# Patient Record
Sex: FEMALE | Race: WHITE | NOT HISPANIC OR LATINO | Employment: OTHER | URBAN - NONMETROPOLITAN AREA
[De-identification: names, ages, dates, MRNs, and addresses within clinical notes are randomized per-mention and may not be internally consistent; named-entity substitution may affect disease eponyms.]

---

## 2017-10-02 ENCOUNTER — HISTORY (OUTPATIENT)
Dept: RADIOLOGY | Facility: OTHER | Age: 67
End: 2017-10-02

## 2017-10-02 ENCOUNTER — OFFICE VISIT - GICH (OUTPATIENT)
Dept: FAMILY MEDICINE | Facility: OTHER | Age: 67
End: 2017-10-02

## 2017-10-02 ENCOUNTER — HOSPITAL ENCOUNTER (OUTPATIENT)
Dept: RADIOLOGY | Facility: OTHER | Age: 67
End: 2017-10-02
Attending: NURSE PRACTITIONER

## 2017-10-02 DIAGNOSIS — I10 ESSENTIAL (PRIMARY) HYPERTENSION: ICD-10-CM

## 2017-10-02 DIAGNOSIS — Z12.31 ENCOUNTER FOR SCREENING MAMMOGRAM FOR MALIGNANT NEOPLASM OF BREAST: ICD-10-CM

## 2017-10-02 DIAGNOSIS — Z87.891 PERSONAL HISTORY OF NICOTINE DEPENDENCE: ICD-10-CM

## 2017-10-02 DIAGNOSIS — Z91.89 OTHER SPECIFIED PERSONAL RISK FACTORS, NOT ELSEWHERE CLASSIFIED: ICD-10-CM

## 2017-10-02 DIAGNOSIS — Z00.00 ENCOUNTER FOR GENERAL ADULT MEDICAL EXAMINATION WITHOUT ABNORMAL FINDINGS: ICD-10-CM

## 2017-10-02 DIAGNOSIS — L82.0 INFLAMED SEBORRHEIC KERATOSIS: ICD-10-CM

## 2017-10-02 DIAGNOSIS — Z66 DO NOT RESUSCITATE: ICD-10-CM

## 2017-10-02 DIAGNOSIS — Z23 ENCOUNTER FOR IMMUNIZATION: ICD-10-CM

## 2017-10-02 LAB
ANION GAP - HISTORICAL: 9 (ref 5–18)
BUN SERPL-MCNC: 8 MG/DL (ref 7–25)
BUN/CREAT RATIO - HISTORICAL: 12
CALCIUM SERPL-MCNC: 9.9 MG/DL (ref 8.6–10.3)
CHLORIDE SERPLBLD-SCNC: 101 MMOL/L (ref 98–107)
CHOL/HDL RATIO - HISTORICAL: 3.42
CHOLESTEROL TOTAL: 195 MG/DL
CO2 SERPL-SCNC: 27 MMOL/L (ref 21–31)
CREAT SERPL-MCNC: 0.69 MG/DL (ref 0.7–1.3)
GFR IF NOT AFRICAN AMERICAN - HISTORICAL: >60 ML/MIN/1.73M2
GLUCOSE SERPL-MCNC: 109 MG/DL (ref 70–105)
HDLC SERPL-MCNC: 57 MG/DL (ref 23–92)
LDLC SERPL CALC-MCNC: 121 MG/DL
NON-HDL CHOLESTEROL - HISTORICAL: 138 MG/DL
POTASSIUM SERPL-SCNC: 4.6 MMOL/L (ref 3.5–5.1)
PROVIDER ORDERDED STATUS - HISTORICAL: ABNORMAL
SODIUM SERPL-SCNC: 137 MMOL/L (ref 133–143)
TRIGL SERPL-MCNC: 87 MG/DL

## 2017-10-02 ASSESSMENT — PATIENT HEALTH QUESTIONNAIRE - PHQ9: SUM OF ALL RESPONSES TO PHQ QUESTIONS 1-9: 0

## 2017-10-04 ENCOUNTER — COMMUNICATION - GICH (OUTPATIENT)
Dept: FAMILY MEDICINE | Facility: OTHER | Age: 67
End: 2017-10-04

## 2017-10-10 ENCOUNTER — HOSPITAL ENCOUNTER (OUTPATIENT)
Dept: RADIOLOGY | Facility: OTHER | Age: 67
End: 2017-10-10
Attending: NURSE PRACTITIONER

## 2017-10-10 DIAGNOSIS — Z87.891 PERSONAL HISTORY OF NICOTINE DEPENDENCE: ICD-10-CM

## 2017-10-18 ENCOUNTER — COMMUNICATION - GICH (OUTPATIENT)
Dept: FAMILY MEDICINE | Facility: OTHER | Age: 67
End: 2017-10-18

## 2017-12-27 NOTE — PROGRESS NOTES
Patient Information     Patient Name MRN Sex Irene Padilla [id number] Female 1950      Progress Notes by Hallie Vargas NP at 10/2/2017  3:00 PM     Author:  Hallie Vargas NP Service:  (none) Author Type:  PHYS- Nurse Practitioner     Filed:  10/2/2017  5:02 PM Encounter Date:  10/2/2017 Status:  Signed     :  Hallie Vargas NP (PHYS- Nurse Practitioner)            SUBJECTIVE:    Irene Saravia is a 67 y.o. female who presents for welcome to Medicare preventive visit and screenings, medication review and other issues.  Continues to follow-up with Dr. Lucero gynecology oncology for her vulvar cancer about every 6 months. Dr. Lucero did a Pap last fall during her office exam which was negative with no endocervical's present.  She reports has never had a abnormal Pap history. Had her mammogram before her visit today and has no concerns.  Has been checking her blood pressure at home running 120-130's/60-80 and has been tolerating her lisinopril.  Smoking tobacco 3 years ago and has stated tobacco free.  Reports a rough patch of skin between her shoulder blades that his been itchy would like checked out.    Patient is  and lives alone independently in the community. Has a good family and friend support systems.    HPI    Allergies      Allergen   Reactions     Ascorbic Acid (Vitamin C)  Herpetiformis Dermatitis     Cold sores    ,   Family History       Problem   Relation Age of Onset     Diabetes  Father      Psychiatric illness  Father      Alzheimer's       Cancer-breast  Sister 40     Good Health  Mother      Hypertension  Mother      Good Health  Sister      Diabetes  Sister      Good Health  Brother      x3       Diabetes  Brother      Diabetes  Other      Positive for diabetes mellitus type 2       Cancer-colon  Paternal Uncle 80     Cancer-ovarian  No Family History      Cancer-prostate  No Family History      Heart Disease  No Family History      Stroke  No Family History       Thyroid Disease  No Family History      Blood Disease  No Family History    ,   Current Outpatient Prescriptions on File Prior to Visit       Medication  Sig Dispense Refill     ranitidine (ZANTAC) 150 mg tablet Take 150 mg by mouth once daily if needed.       No current facility-administered medications on file prior to visit.    ,   Current Outpatient Prescriptions:      lisinopril (PRINIVIL; ZESTRIL) 10 mg tablet, Take 1 tablet by mouth once daily., Disp: 90 tablet, Rfl: 3     ranitidine (ZANTAC) 150 mg tablet, Take 150 mg by mouth once daily if needed., Disp: , Rfl:   Medications have been reviewed by me and are current to the best of my knowledge and ability.,   Past Medical History:     Diagnosis  Date     Hx of one miscarriage      LSIL (low grade squamous intraepithelial lesion) on Pap smear      Smoker    ,   Patient Active Problem List       Diagnosis  Date Noted     DNR (do not resuscitate)  10/02/2017     Seborrheic keratoses, inflamed  10/02/2017     Health care maintenance  10/19/2016     History of loop electrical excision procedure (LEEP)  09/28/2016     Breast cancer screening  09/28/2016     History of tobacco use  09/28/2016     Family history of hypertension  09/28/2016     Primary vulvar squamous cell carcinoma (HC)  07/31/2014     PAP SMEAR, ABNORMAL, ASCUS  03/23/2011     TOBACCO ABUSE       HERNIATED DISC       neck        ,   Past Surgical History:      Procedure  Laterality Date     COLONOSCOPY DIAGNOSTIC  10/31/2016    F/U 2026       COLPOSCOPY  2011    LEEP, alex       NECK/THORAX PROCEDURE      Reduction of herniated disc       RADIATION TREATMENT  8/2014    vulvar cancer, chemotherapy      and   Social History       Substance Use Topics         Smoking status:   Former Smoker     Packs/day:  0.25     Years:  40.00     Types:  Cigarettes     Quit date:  8/28/2015     Smokeless tobacco:   Never Used      Comment: It's been over one year since quitting      Alcohol use   No  "      REVIEW OF SYSTEMS:  Review of Systems   Constitutional: Negative.    HENT: Negative.    Eyes: Negative.    Respiratory: Negative.    Cardiovascular: Negative.    Gastrointestinal: Negative.    Genitourinary: Negative.    Musculoskeletal: Negative.    Skin: Positive for itching and rash.   Neurological: Negative.    Endo/Heme/Allergies: Negative.    Psychiatric/Behavioral: Negative.        OBJECTIVE:  /78  Pulse 100  Ht 1.575 m (5' 2\")  Wt 73.7 kg (162 lb 8 oz)  Breastfeeding? No  BMI 29.72 kg/m2    EXAM:   Physical Exam   Constitutional: She is oriented to person, place, and time and well-developed, well-nourished, and in no distress.   HENT:   Head: Normocephalic and atraumatic.   Mouth/Throat: Oropharynx is clear and moist.   Eyes: Conjunctivae and EOM are normal. Pupils are equal, round, and reactive to light. Right eye exhibits no discharge. Left eye exhibits no discharge. No scleral icterus.   Neck: Normal range of motion. Neck supple. No JVD present.   Cardiovascular: Normal rate, regular rhythm, normal heart sounds and intact distal pulses.  Exam reveals no gallop and no friction rub.    No murmur heard.  Pulmonary/Chest: Effort normal and breath sounds normal.   Musculoskeletal: Normal range of motion.   Lymphadenopathy:     She has no cervical adenopathy.   Neurological: She is alert and oriented to person, place, and time. Gait normal.   Skin: Skin is warm and dry.   Dome-shaped 2cm seborrheic keratosis rough surface above bra line posterior back--- treated with cryo-until white ×3--tolerated well   Psychiatric: Mood, memory, affect and judgment normal.   Nursing note and vitals reviewed.      ASSESSMENT/PLAN:    ICD-10-CM    1. Welcome to Medicare preventive visit Z00.00 BASIC METABOLIC PANEL      LIPID PANEL      BASIC METABOLIC PANEL      LIPID PANEL   2. Hypertension I10 lisinopril (PRINIVIL; ZESTRIL) 10 mg tablet      BASIC METABOLIC PANEL      LIPID PANEL      BASIC METABOLIC PANEL "      LIPID PANEL   3. Flu vaccine need Z23 FLU VACCINE => 3 YRS PF QUADRIVALENT IIV4 IM      [place] ADMIN FLU VACC SEASONAL (MEDICARE)   4. Streptococcus pneumoniae vaccination indicated Z91.89    5. History of tobacco use Z87.891 OMNI PREVNAR 13 (AKA PNEUMOCOCCAL VACCINE 13-VALENT IM)      US ABD AORTA SCREENING   6. Encounter for immunization  Z23 OMNI PREVNAR 13 (AKA PNEUMOCOCCAL VACCINE 13-VALENT IM)      [place] ADMIN EA ADDL VACC   7. DNR (do not resuscitate) Z66    8. Seborrheic keratoses, inflamed L82.0     reviewed negative mammography findings during office visit    labs pending    abdominal aorta ultrasound screening pending    Appears irritated seborrheic keratosis on back from clothing changes--- we'll treat with cryo-as patient wishes to proceed    Plan:  Prevnar 13 and influenza vaccine updated    Medication reviewed and refilled    Reviewed advanced healthcare directive during office visit--will complete 5 wishes and return to scan into medical records later  patient is adamant she wants to be a DNR status    Patient will have Pap done at GYN oncology with follow-ups    discussed cryo-aftercare--- try to avoid direct contact or irritation until healed--- monitor for any redness or signs of infection    Discussed shingles vaccine--could obtain a retail pharmacy  colonoscopy up-to-date done in 2016

## 2017-12-27 NOTE — PROGRESS NOTES
Patient Information     Patient Name MRN Sex Irene Padilla [id number] Female 1950      Progress Notes by Keely Gray R.T. (ARRT) at 10/2/2017  2:30 PM     Author:  Keely [facility] R.T. [facility] Service:  (none) Author Type:  (none)     Filed:  10/2/2017  2:30 PM Date of Service:  10/2/2017  2:30 PM Status:  Signed     :  Keely Gray R.T. (IZAT) ([facility] - Registered Radiologic Technologist)            Falls Risk Criteria:    Age 65 and older or under age 4        Sensory deficits    Poor vision    Use of ambulatory aides    Impaired judgment    Unable to walk independently    Meets High Risk criteria for falls:  Yes               1.  Do you have dizziness or vertigo?    no                    2.  Do you need help standing or walking?   no                 3.  Have you fallen within the last 6 months?    no           4.  Has the patient been fasting?      no       If any risks are marked Yes, the following interventions are utilized:    Do not leave patient unattended     Assist patient in the dressing room and bathroom    Have ambulatory aides available throughout procedure    Involve patient s family if available

## 2017-12-28 NOTE — PROGRESS NOTES
Patient Information     Patient Name MRN Sex Irene Padilla [id number] Female 1950      Progress Notes by Dede Heard R.T. ([facility]) at 10/10/2017 12:01 PM     Author:  [facility] Service:  (none) Author Type:  [employer] - Registered Radiologic Technologist     Filed:  10/10/2017 12:02 PM Date of Service:  10/10/2017 12:01 PM Status:  Signed     :  Dede Heard R.T. (ARRT) ([facility] - Registered Radiologic Technologist)            Falls Risk Criteria:    Age 65 and older or under age 4        Sensory deficits    Poor vision    Use of ambulatory aides    Impaired judgment    Unable to walk independently    Meets High Risk criteria for falls:  Yes               1.  Do you have dizziness or vertigo?    no                    2.  Do you need help standing or walking?   no                 3.  Have you fallen within the last 6 months?    no           4.  Has the patient been fasting?      yes       If any risks are marked Yes, the following interventions are utilized:    Do not leave patient unattended     Assist patient in the dressing room and bathroom    Have ambulatory aides available throughout procedure    Involve patient s family if available

## 2017-12-29 NOTE — PATIENT INSTRUCTIONS
Patient Information     Patient Name MRN Sex Irene Padilla [id number] Female 1950      Patient Instructions by Hallie Vargas NP at 10/2/2017  3:00 PM     Author:  Hallie Vargas NP Service:  (none) Author Type:  PHYS- Nurse Practitioner     Filed:  10/2/2017  5:01 PM Encounter Date:  10/2/2017 Status:  Signed     :  Hallie Vargas NP (PHYS- Nurse Practitioner)            Monitor skin site for any signs of infection    Keep clean and prevent irritation should heal in the next 2-3 weeks and dry/crumble off

## 2017-12-30 NOTE — NURSING NOTE
Patient Information     Patient Name MRN Irene Bundy [id number] Female 1950      Nursing Note by Latoya Mcdowell at 10/2/2017  3:00 PM     Author:  Latoya Mcdowell Service:  (none) Author Type:  (none)     Filed:  10/2/2017  3:17 PM Encounter Date:  10/2/2017 Status:  Signed     :  Latoya Mcdowell            Patient presents to clinic today for a Welcome to Medicare Physical. She states she is feeling well and doesn't have any concerns. She states she would like a flu shot.    Latoya Mcdowell LPN...................10/2/2017  2:54 PM

## 2018-01-26 VITALS
DIASTOLIC BLOOD PRESSURE: 78 MMHG | WEIGHT: 162.5 LBS | BODY MASS INDEX: 29.9 KG/M2 | SYSTOLIC BLOOD PRESSURE: 138 MMHG | HEART RATE: 100 BPM | HEIGHT: 62 IN

## 2018-01-29 ASSESSMENT — PATIENT HEALTH QUESTIONNAIRE - PHQ9: SUM OF ALL RESPONSES TO PHQ QUESTIONS 1-9: 0

## 2018-02-14 ENCOUNTER — DOCUMENTATION ONLY (OUTPATIENT)
Dept: FAMILY MEDICINE | Facility: OTHER | Age: 68
End: 2018-02-14

## 2018-02-14 PROBLEM — L82.0 SEBORRHEIC KERATOSES, INFLAMED: Status: ACTIVE | Noted: 2017-10-02

## 2018-02-14 PROBLEM — Z66 DNR (DO NOT RESUSCITATE): Status: ACTIVE | Noted: 2017-10-02

## 2018-02-14 PROBLEM — Z72.0 TOBACCO ABUSE: Status: ACTIVE | Noted: 2018-02-14

## 2018-02-14 RX ORDER — LISINOPRIL 10 MG/1
10 TABLET ORAL DAILY
COMMUNITY
Start: 2017-10-02 | End: 2018-10-02

## 2018-07-23 NOTE — PROGRESS NOTES
Patient Information     Patient Name  Irene Saravia MRN  [id number] Sex  Female   1950      Letter by Gwen Alonso MD at      Author:  Gwen Alonso MD Service:  (none) Author Type:  (none)    Filed:   Date of Service:   Status:  (Other)       [facility]  [address]  [facility] [address]  [phone]         Irene Saravia   [address]  [facility] [address]      October 3, 2017  Date of Breast Imaging: 10/02/2017  2:41 PM    Dear Ms. Saravia:  We are pleased to inform you that the result of your recent breast imaging examination is normal/benign (not cancer). A report of your results was sent to your health care provider(s).    Your mammogram shows that your breast tissue is dense.  Dense breast tissue is relatively common and is found in more than 50 percent of women. Dense breast tissue may be associated with a slight increased risk of breast cancer and may make cancer more difficult to detect by mammogram. However, the actual risk of breast cancer for women with dense breast tissue is still low. This information is given to you to raise your own awareness and help you talk with your primary care provider. Together, you can decide which screening options are right for you.     Your images will become part of your medical file here at [facility] and will be available for your continuing care. You are responsible for informing any new health care provider or breast imaging facility of the date and location of this examination.    Mammography remains the gold standard and is the most accurate method for early detection. Mammograms are the only medical imaging test shown to reduce breast cancer deaths. Not all cancers are found through mammography. If you notice any new changes in your breast(s) please inform your healthcare provider.     Thank you for choosing [facility] to participate in your healthcare needs.     [facility]  [facility] Recommendations for Early Breast Cancer Detection   in Women without Symptoms  When to start having mammograms to screen for breast cancer, and how often to have them, is a personal decision. It should be based on your preferences, your values and your risk for developing breast cancer. [facility] recommends that you and your health care provider together determine when mammograms are right for you.    [facility] recommends the following guidelines for women who have an average risk for breast cancer, based on American Cancer Society guidelines:    Age 40 to 44: Mammograms are optional.     Age 45 to 54: Have a mammogram every year.           Age 55 and older: Have a mammogram every year, or transition to having one every 2 years. Continue to have mammograms as long as your health is good.  If you have a higher than average risk for breast cancer, your health care provider may recommend a different schedule.

## 2018-07-24 NOTE — PROGRESS NOTES
Patient Information     Patient Name  Irene Sarvaia MRN  [id number] Sex  Female   1950      Letter by Hallie Vargas NP at      Author:  Hallie Vargas NP Service:  (none) Author Type:  (none)    Filed:   Encounter Date:  10/4/2017 Status:  (Other)           Irene Saravia  [address] [facility] [address]          2017    Dear Ms. Saravia:    You are receiving this letter because you recently took a test to measure your current fasting lipid levels.  Your current fasting lipid levels from 10/2/2017 are:    I am happy to report that your cholesterol and lipids are within acceptable and target range and your other labs show normal indicators of kidney function.  Please continue your daily dietary and exercise measures as they are working well for you at this time.    Cholesterol:  195 mg/dL   Triglycerides: 87 mg/dL   HDL Cholesterol: 57 mg/dL   LDL Cholesterol:  Direct LDL Cholesterol:   121 mg/dL  No results found in past 5 years mg/dL   Cholesterol/HDL ratio: 3.42      Results for orders placed or performed in visit on 10/02/17      BASIC METABOLIC PANEL      Result  Value Ref Range    SODIUM 137 133 - 143 mmol/L    POTASSIUM 4.6 3.5 - 5.1 mmol/L    CHLORIDE 101 98 - 107 mmol/L    CO2,TOTAL 27 21 - 31 mmol/L    ANION GAP 9 5 - 18                    GLUCOSE 109 (H) 70 - 105 mg/dL    CALCIUM 9.9 8.6 - 10.3 mg/dL    BUN 8 7 - 25 mg/dL    CREATININE 0.69 (L) 0.70 - 1.30 mg/dL    BUN/CREAT RATIO           12                    GFR if African American >60 >60 ml/min/1.73m2    GFR if not African American >60 >60 ml/min/1.73m2   LIPID PANEL      Result  Value Ref Range    CHOLESTEROL,TOTAL 195 <200 mg/dL    TRIGLYCERIDES 87 <150 mg/dL    HDL CHOLESTEROL 57 23 - 92 mg/dL    NON-HDL CHOLESTEROL 138 <145 mg/dl    CHOL/HDL RATIO            3.42 <4.50                    LDL CHOLESTEROL 121 (H) <100 mg/dL    PROVIDER ORDERED STATUS FASTING        If you are at normal or low risk, your goal should  be to have a total cholesterol level of less than 200, a LDL (bad cholesterol) level of less than 130, a HDL (good cholesterol) level of greater than 45 and a triglyceride level of less than 150.    If you have certain risk factors, such as diabetes or existing heart disease, your goal should be to lower your total cholesterol to a number below 180 and your LDL level to a number less than 100.    Please continue to monitor your diet for cholesterol and fats, especially saturated fats.  You should also continue to increase your intake of fiber and vegetables, and continue your exercise program.    Sincerely,    Hallie Vargas NP ....................  10/4/2017   7:50 AM    [facility]

## 2018-07-24 NOTE — PROGRESS NOTES
Patient Information     Patient Name  Irene Saravia MRN  [id number] Sex  Female   1950      Letter by Hallie Vargas NP at      Author:  Hallie Vargas NP Service:  (none) Author Type:  (none)    Filed:   Encounter Date:  10/18/2017 Status:  (Other)           Irene Saravia  [address] [facility] [address]          2017    Dear Ms. Saravia:    Your abdominal ultrasound screening test did not show any blood vessel abnormalities.      If you have any further questions or problems contact my office at  [phone]    Thank you,    Hallie Vargas NP ....................  10/18/2017   6:36 PM

## 2018-10-02 ENCOUNTER — OFFICE VISIT (OUTPATIENT)
Dept: FAMILY MEDICINE | Facility: OTHER | Age: 68
End: 2018-10-02
Attending: NURSE PRACTITIONER
Payer: MEDICARE

## 2018-10-02 ENCOUNTER — HOSPITAL ENCOUNTER (OUTPATIENT)
Dept: MAMMOGRAPHY | Facility: OTHER | Age: 68
Discharge: HOME | End: 2018-10-02
Attending: NURSE PRACTITIONER | Admitting: NURSE PRACTITIONER
Payer: MEDICARE

## 2018-10-02 VITALS
HEIGHT: 62 IN | WEIGHT: 166.6 LBS | DIASTOLIC BLOOD PRESSURE: 82 MMHG | HEART RATE: 96 BPM | TEMPERATURE: 98 F | BODY MASS INDEX: 30.66 KG/M2 | SYSTOLIC BLOOD PRESSURE: 140 MMHG

## 2018-10-02 DIAGNOSIS — C51.9 PRIMARY VULVAR SQUAMOUS CELL CARCINOMA (H): ICD-10-CM

## 2018-10-02 DIAGNOSIS — Z23 ENCOUNTER FOR IMMUNIZATION: ICD-10-CM

## 2018-10-02 DIAGNOSIS — I10 HYPERTENSION, UNSPECIFIED TYPE: Primary | ICD-10-CM

## 2018-10-02 DIAGNOSIS — Z79.899 ENCOUNTER FOR MEDICATION REVIEW: ICD-10-CM

## 2018-10-02 DIAGNOSIS — Z12.39 SCREENING BREAST EXAMINATION: ICD-10-CM

## 2018-10-02 LAB
ANION GAP SERPL CALCULATED.3IONS-SCNC: 13 MMOL/L (ref 3–14)
BUN SERPL-MCNC: 13 MG/DL (ref 7–25)
CALCIUM SERPL-MCNC: 9.9 MG/DL (ref 8.6–10.3)
CHLORIDE SERPL-SCNC: 98 MMOL/L (ref 98–107)
CHOLEST SERPL-MCNC: 232 MG/DL
CO2 SERPL-SCNC: 22 MMOL/L (ref 21–31)
CREAT SERPL-MCNC: 0.7 MG/DL (ref 0.6–1.2)
GFR SERPL CREATININE-BSD FRML MDRD: 84 ML/MIN/1.7M2
GLUCOSE SERPL-MCNC: 122 MG/DL (ref 70–105)
HDLC SERPL-MCNC: 57 MG/DL (ref 23–92)
LDLC SERPL CALC-MCNC: 153 MG/DL
NONHDLC SERPL-MCNC: 175 MG/DL
POTASSIUM SERPL-SCNC: 4.2 MMOL/L (ref 3.5–5.1)
SODIUM SERPL-SCNC: 133 MMOL/L (ref 134–144)
TRIGL SERPL-MCNC: 108 MG/DL

## 2018-10-02 PROCEDURE — 80061 LIPID PANEL: CPT | Performed by: NURSE PRACTITIONER

## 2018-10-02 PROCEDURE — G0008 ADMIN INFLUENZA VIRUS VAC: HCPCS

## 2018-10-02 PROCEDURE — 99214 OFFICE O/P EST MOD 30 MIN: CPT | Performed by: NURSE PRACTITIONER

## 2018-10-02 PROCEDURE — 80048 BASIC METABOLIC PNL TOTAL CA: CPT | Performed by: NURSE PRACTITIONER

## 2018-10-02 PROCEDURE — 36415 COLL VENOUS BLD VENIPUNCTURE: CPT | Performed by: NURSE PRACTITIONER

## 2018-10-02 PROCEDURE — 90686 IIV4 VACC NO PRSV 0.5 ML IM: CPT | Performed by: NURSE PRACTITIONER

## 2018-10-02 PROCEDURE — G0463 HOSPITAL OUTPT CLINIC VISIT: HCPCS

## 2018-10-02 PROCEDURE — 96372 THER/PROPH/DIAG INJ SC/IM: CPT

## 2018-10-02 PROCEDURE — G0463 HOSPITAL OUTPT CLINIC VISIT: HCPCS | Mod: 25

## 2018-10-02 PROCEDURE — 77063 BREAST TOMOSYNTHESIS BI: CPT

## 2018-10-02 RX ORDER — LISINOPRIL 10 MG/1
10 TABLET ORAL DAILY
Qty: 90 TABLET | Refills: 4 | Status: SHIPPED | OUTPATIENT
Start: 2018-10-02 | End: 2019-10-16

## 2018-10-02 ASSESSMENT — PAIN SCALES - GENERAL: PAINLEVEL: NO PAIN (0)

## 2018-10-02 NOTE — LETTER
October 2, 2018      Irene Saravia  [address] [facility] [address]        Dear ,    We are writing to inform you of your test results.  Your cholesterol and LDL cholesterol are little elevated from last year.  Your calculated cardiac risk is above 7%--occasions to treat over 7%  Based on history and risk factors  We could recheck your cholesterol and lipids fasting in 2 months and you could try lifestyle and dietary measures  Or we could start a medication to lower your cholesterol and lipids.  Please give me a call at your earliest convenience if agreeable.      The 10-year ASCVD risk score ([place] DIALLO Jr, et al., 2013) is: 10.4%    Values used to calculate the score:      Age: 68 years      Sex: Female      Is Non- : No      Diabetic: No      Tobacco smoker: No      Systolic Blood Pressure: 140 mmHg      Is BP treated: Yes      HDL Cholesterol: 57 mg/dL      Total Cholesterol: 232 mg/dL    Results for orders placed or performed in visit on 10/02/18   Lipid Panel   Result Value Ref Range    Cholesterol 232 (H) <200 mg/dL    Triglycerides 108 <150 mg/dL    HDL Cholesterol 57 23 - 92 mg/dL    LDL Cholesterol Calculated 153 (H) <100 mg/dL    Non HDL Cholesterol 175 (H) <130 mg/dL   Basic Metabolic Panel   Result Value Ref Range    Sodium 133 (L) 134 - 144 mmol/L    Potassium 4.2 3.5 - 5.1 mmol/L    Chloride 98 98 - 107 mmol/L    Carbon Dioxide 22 21 - 31 mmol/L    Anion Gap 13 3 - 14 mmol/L    Glucose 122 (H) 70 - 105 mg/dL    Urea Nitrogen 13 7 - 25 mg/dL    Creatinine 0.70 0.60 - 1.20 mg/dL    GFR Estimate 84 >60 mL/min/1.7m2    GFR Estimate If Black >90 >60 mL/min/1.7m2    Calcium 9.9 8.6 - 10.3 mg/dL             If you have any questions or concerns, please call the clinic at the number listed above.       Sincerely,        TERRIE Logan CNP

## 2018-10-02 NOTE — PROGRESS NOTES
SUBJECTIVE:   Irene Saravia is a 68 year old female who presents to clinic today for the following health issues:    Patient review and refill on lisinopril for hypertension--has been 120's systolically at home.  Following up with Dr. Lucero gynecology for her vulvar cancer history, negative follow-ups, last follow-up 5 years 2018--have Pap completed at that time, last Pap negative  Will have PAP at that time  Plans to come in before that last appointment for pelvic exam  Reports is not had any irritations or issues with some radiation the initial  Has mammography scheduled later today  Has no concerns  Monitoring blood pressure at home with a wrist cuff    HPI    Patient Active Problem List   Diagnosis     Breast cancer screening     DNR (do not resuscitate)     Family history of hypertension     Health care maintenance     Displacement of intervertebral disc     History of loop electrical excision procedure (LEEP)     History of tobacco use     Papanicolaou smear of cervix with atypical squamous cells of undetermined significance (ASC-US)     Primary vulvar squamous cell carcinoma (H)     Seborrheic keratoses, inflamed     Tobacco abuse     Past Surgical History:   Procedure Laterality Date     COLONOSCOPY      10/31/2016,F/U 2026     OTHER SURGICAL HISTORY      2011,ZXW642,COLPOSCOPY,LEEP, [place]     OTHER SURGICAL HISTORY      21899,NECK/THORAX PROCEDURE,Reduction of herniated disc     OTHER SURGICAL HISTORY      8/2014,208356,RADIATION TREATMENT,vulvar cancer, chemotherapy       Social History   Substance Use Topics     Smoking status: Former Smoker     Packs/day: 0.25     Years: 40.00     Types: Cigarettes     Quit date: 8/28/2015     Smokeless tobacco: Never Used      Comment: Quit smoking: It's been over one year since quitting     Alcohol use No     Family History   Problem Relation Age of Onset     Diabetes Father      Diabetes     Other - See Comments Father      Psychiatric illness,Alzheimer's  "    Breast Cancer Sister 40     Cancer-breast     Family History Negative Mother      Good Health     Hypertension Mother      Hypertension     Family History Negative Sister      Good Health     Diabetes Sister      Diabetes     Family History Negative Brother      Good Health,x3     Diabetes Brother      Diabetes     Diabetes Other      Diabetes,Positive for diabetes mellitus type 2     Colon Cancer Paternal Uncle 80     Cancer-colon     Ovarian Cancer No family hx of      Cancer-ovarian     Prostate Cancer No family hx of      Cancer-prostate     HEART DISEASE No family hx of      Heart Disease     Thyroid Disease No family hx of      Thyroid Disease     Blood Disease No family hx of      Blood Disease         Current Outpatient Prescriptions   Medication Sig Dispense Refill     lisinopril (PRINIVIL/ZESTRIL) 10 MG tablet Take 1 tablet (10 mg) by mouth daily 90 tablet 4     ranitidine (ZANTAC) 150 MG tablet Take 150 mg by mouth daily as needed       [DISCONTINUED] lisinopril (PRINIVIL/ZESTRIL) 10 MG tablet Take 10 mg by mouth daily       Allergies   Allergen Reactions     Ascorbate Other (See Comments)     Cold sores     Recent Labs   Lab Test  10/02/18   1254  10/02/17   1659  09/28/16   1243   LDL  153*  121*  137*   HDL  57  57  59   TRIG  108  87  89   CR  0.70  0.69*  0.83   GFRESTIMATED  84   --    --    GFRESTBLACK  >90  >60  >60   POTASSIUM  4.2  4.6  3.7      BP Readings from Last 3 Encounters:   10/02/18 140/82   10/02/17 138/78   10/26/16 140/82    Wt Readings from Last 3 Encounters:   10/02/18 166 lb 9.6 oz (75.6 kg)   10/02/17 162 lb 8 oz (73.7 kg)   10/26/16 157 lb 4 oz (71.3 kg)                  Labs reviewed in EPIC    Review of Systems   All other systems reviewed and are negative.       OBJECTIVE:     /82 (BP Location: Right arm, Patient Position: Right side, Cuff Size: Adult Regular)  Pulse 96  Temp 98  F (36.7  C) (Temporal)  Ht 5' 2\" (1.575 m)  Wt 166 lb 9.6 oz (75.6 kg)  " Breastfeeding? No  BMI 30.47 kg/m2  Body mass index is 30.47 kg/(m^2).  Physical Exam   Constitutional: She is oriented to person, place, and time. She appears well-developed and well-nourished.   Cardiovascular: Normal rate, regular rhythm and normal heart sounds.    Pulmonary/Chest: Effort normal and breath sounds normal.   Musculoskeletal: Normal range of motion.   Neurological: She is alert and oriented to person, place, and time.   Skin: Skin is warm and dry.   Psychiatric: She has a normal mood and affect. Her behavior is normal. Judgment and thought content normal.   Nursing note and vitals reviewed.      Results for orders placed or performed in visit on 10/02/18   Lipid Panel   Result Value Ref Range    Cholesterol 232 (H) <200 mg/dL    Triglycerides 108 <150 mg/dL    HDL Cholesterol 57 23 - 92 mg/dL    LDL Cholesterol Calculated 153 (H) <100 mg/dL    Non HDL Cholesterol 175 (H) <130 mg/dL   Basic Metabolic Panel   Result Value Ref Range    Sodium 133 (L) 134 - 144 mmol/L    Potassium 4.2 3.5 - 5.1 mmol/L    Chloride 98 98 - 107 mmol/L    Carbon Dioxide 22 21 - 31 mmol/L    Anion Gap 13 3 - 14 mmol/L    Glucose 122 (H) 70 - 105 mg/dL    Urea Nitrogen 13 7 - 25 mg/dL    Creatinine 0.70 0.60 - 1.20 mg/dL    GFR Estimate 84 >60 mL/min/1.7m2    GFR Estimate If Black >90 >60 mL/min/1.7m2    Calcium 9.9 8.6 - 10.3 mg/dL         ASSESSMENT/PLAN:     medication reviewed and refill x 1 year--will continue to monitor with home cuff    Mammography screening results pending    If she is agreeable would start statin    The 10-year ASCVD risk score (Emmy YOEL Jr, et al., 2013) is: 10.4%    Values used to calculate the score:      Age: 68 years      Sex: Female      Is Non- : No      Diabetic: No      Tobacco smoker: No      Systolic Blood Pressure: 140 mmHg      Is BP treated: Yes      HDL Cholesterol: 57 mg/dL      Total Cholesterol: 232 mg/dL     She will come in before final 5 year folowup with  Gyn/onc  Pelvic exam so that I can continue to monitor        1. Encounter for immunization    - GH IMM-  HC FLU VAC PRESRV FREE QUAD SPLIT VIR 3+YRS IM    2. HTN (hypertension)      3. Hypertension, unspecified type    - lisinopril (PRINIVIL/ZESTRIL) 10 MG tablet; Take 1 tablet (10 mg) by mouth daily  Dispense: 90 tablet; Refill: 4  - Lipid Panel; Future  - Basic Metabolic Panel; Future  - Lipid Panel  - Basic Metabolic Panel    4. Encounter for medication review      5. Primary vulvar squamous cell carcinoma (H)          TERRI[facility]

## 2018-10-02 NOTE — PATIENT INSTRUCTIONS
Mammography    Mammography is an X-ray exam of your breast tissue. The image it makes is called a mammogram. A mammogram can help find problems with your breasts, such as cysts or cancer. Mammography is the best breast cancer screening tool available.  Have screening mammograms and professional breast exams as often as your healthcare provider recommends. Also, be sure you know how your breasts normally look and feel. This makes it easier to notice any changes. Report changes to your healthcare provider as soon as possible.    How do I get ready for a mammogram?     Schedule the test for 1 week after your period. Your breasts are less sore then.    Make sure your clinic gets images of your last mammogram if it was done somewhere else. This lets the provider compare the 2 sets of images for any changes.    On the morning of your test, don t use deodorant, powder, or perfume.    Wear a top that you can take off easily.  What happens during a mammogram?     You will need to undress from the waist up.    The technologist will position your breast to get the best test results.    Each of your breasts will be compressed one at a time. This helps get the most complete X-ray image.    Your breasts will be repositioned to get at least 2 separate views of each breast.  What happens after a mammogram?    More X-rays are sometimes needed. If not done at the time of your initial mammogram, you ll be called to schedule them.    You should receive your test results in writing. Ask about this on the day of your appointment.    Have mammograms as often as your healthcare provider recommends.  Let the technologist know if:    You re pregnant or think you may be pregnant    You have breast implants    You have any scars or moles on or near your breasts    You ve had a breast biopsy or surgery    You re breastfeeding   Date Last Reviewed: 6/1/2017 2000-2017 [employer]. [address] [place] [address], [place] [address]. All  rights reserved. This information is not intended as a substitute for professional medical care. Always follow your healthcare professional's instructions.

## 2018-10-02 NOTE — MR AVS SNAPSHOT
After Visit Summary   10/2/2018    Irene Saravia    MRN: [id number]           Patient Information     Date Of Birth          1950        Visit Information        Provider Department      10/2/2018 11:15 AM Hallie Vargas APRN [facility]        Today's Diagnoses     Hypertension, unspecified type    -  1    Encounter for immunization        HTN (hypertension)          Care Instructions      Mammography    Mammography is an X-ray exam of your breast tissue. The image it makes is called a mammogram. A mammogram can help find problems with your breasts, such as cysts or cancer. Mammography is the best breast cancer screening tool available.  Have screening mammograms and professional breast exams as often as your healthcare provider recommends. Also, be sure you know how your breasts normally look and feel. This makes it easier to notice any changes. Report changes to your healthcare provider as soon as possible.    How do I get ready for a mammogram?     Schedule the test for 1 week after your period. Your breasts are less sore then.    Make sure your clinic gets images of your last mammogram if it was done somewhere else. This lets the provider compare the 2 sets of images for any changes.    On the morning of your test, don t use deodorant, powder, or perfume.    Wear a top that you can take off easily.  What happens during a mammogram?     You will need to undress from the waist up.    The technologist will position your breast to get the best test results.    Each of your breasts will be compressed one at a time. This helps get the most complete X-ray image.    Your breasts will be repositioned to get at least 2 separate views of each breast.  What happens after a mammogram?    More X-rays are sometimes needed. If not done at the time of your initial mammogram, you ll be called to schedule them.    You should receive your test results in writing. Ask about this on the  day of your appointment.    Have mammograms as often as your healthcare provider recommends.  Let the technologist know if:    You re pregnant or think you may be pregnant    You have breast implants    You have any scars or moles on or near your breasts    You ve had a breast biopsy or surgery    You re breastfeeding   Date Last Reviewed: 6/1/2017 2000-2017 [employer]. [address] [place] [address]. All rights reserved. This information is not intended as a substitute for professional medical care. Always follow your healthcare professional's instructions.                Follow-ups after your visit        Your next 10 appointments already scheduled     Oct 02, 2018  1:30 PM CDT   (Arrive by 1:15 PM)   MA SCREENING BILATERAL W/ PETRA with GHMA2   [facility] ([facility])    [address]  [place] [address]   [phone]           How do I prepare for my exam? (Food and drink instructions) No Food and Drink Restrictions.  How do I prepare for my exam? (Other instructions) Do not use any powder, lotion or deodorant under your arms or on your breast. If you do, we will ask you to remove it before your exam.  What should I wear: Wear comfortable, two-piece clothing.  How long does the exam take: Most scans will take 15 minutes.  What should I bring: Bring any previous mammograms from other facilities or have them mailed to the breast center.  Do I need a :  No  is needed.  What do I need to tell my doctor: If you have any allergies, tell your care team.  What should I do after the exam: No restrictions, You may resume normal activities.  What is this test: This test is an x-ray of the breast to look for breast disease. The breast is pressed between two plates to flatten and spread the tissue. An X-ray is taken of the breast from different angles.  Who should I call with questions: If you have any questions, please call  "the Imaging Department where you will have your exam. Directions, parking instructions, and other information is available on our website, [email or web address].  Other information about my exam Three-dimensional (3D) mammograms are available at [facility] locations in [facility], [facility], [facility], [place], and [place]. [facility] locations include [place] and the [facility] [place] and [facility] in [place].  Benefits of 3D mammograms include: * Improved rate of cancer detection * Decreases your chance of having to go back for more tests, which means fewer: * \"False-positive\" results (This means that there is an abnormal area but it isn't cancer.) * Invasive testing procedures, such as a biopsy or surgery * Can provide clearer images of the breast if you have dense breast tissue.  *3D mammography is an optional exam that anyone can have with a 2D mammogram. It doesn't replace or take the place of a 2D mammogram. 2D mammograms remain an effective screening test for all women.  Not all insurance companies cover the cost of a 3D mammogram. Check with your insurance.              Future tests that were ordered for you today     Open Future Orders        Priority Expected Expires Ordered    Lipid Panel Routine  10/2/2019 10/2/2018    Basic Metabolic Panel Routine  10/2/2019 10/2/2018            Who to contact     If you have questions or need follow up information about today's clinic visit or your schedule please contact [facility] directly at [phone].  Normal or non-critical lab and imaging results will be communicated to you by MyChart, letter or phone within 4 business days after the clinic has received the results. If you do not hear from us within 7 days, please contact the clinic through [employer] or phone. If you have a critical or abnormal lab result, we will notify you by phone as soon as possible.  Submit refill requests through [employer]imple or call your pharmacy and " "they will forward the refill request to us. Please allow 3 business days for your refill to be completed.          Additional Information About Your Visit        Care EveryWhere ID     This is your Care EveryWhere ID. This could be used by other organizations to access your [facility] medical records  [id number]        Your Vitals Were     Pulse Temperature Height Breastfeeding? BMI (Body Mass Index)       96 98  F (36.7  C) (Temporal) 5' 2\" (1.575 m) No 30.47 kg/m2        Blood Pressure from Last 3 Encounters:   10/02/18 140/82   10/02/17 138/78   10/26/16 140/82    Weight from Last 3 Encounters:   10/02/18 166 lb 9.6 oz (75.6 kg)   10/02/17 162 lb 8 oz (73.7 kg)   10/26/16 157 lb 4 oz (71.3 kg)              We Performed the Following     GH IMM-  HC FLU VAC PRESRV FREE QUAD SPLIT VIR 3+YRS IM          Where to get your medicines      These medications were sent to [facility] [address] [address]     Phone:  [phone]     lisinopril 10 MG tablet          Primary Care Provider Office Phone # Fax #    Hallie TERRIE Bee [phone]       [address] [place] [address]        Equal Access to Services     NORMA ZAVALA : Hadii aad ku hadasho Soadelineali, waaxda luqadaha, qaybta kaalmada alexus, jordy barakat. So [facility] [phone].    ATENCIÓN: Si habla español, tiene a miramontes disposición servicios gratuitos de asistencia lingüística. Guerrero al [phone].    We comply with applicable federal civil rights laws and [place] laws. We do not discriminate on the basis of race, color, national origin, age, disability, sex, sexual orientation, or gender identity.            Thank you!     Thank you for choosing [facility]  for your care. Our goal is always to provide you with excellent care. Hearing back from our patients is one way we can continue to improve our " services. Please take a few minutes to complete the written survey that you may receive in the mail after your visit with us. Thank you!             Your Updated Medication List - Protect others around you: Learn how to safely use, store and throw away your medicines at www.disposemymeds.org.          This list is accurate as of 10/2/18 12:47 PM.  Always use your most recent med list.                   Brand Name Dispense Instructions for use Diagnosis    lisinopril 10 MG tablet    PRINIVIL/ZESTRIL    90 tablet    Take 1 tablet (10 mg) by mouth daily    Hypertension, unspecified type       ranitidine 150 MG tablet    ZANTAC     Take 150 mg by mouth daily as needed

## 2018-10-02 NOTE — NURSING NOTE
Patient is here today for an annual wellness medicare exam. She said she has no concerns at this time. She does need refills on her Lisinopril and would like her flu shot today. Naina Gomez LPN......................10/2/2018 11:39 AM

## 2019-10-04 ENCOUNTER — HOSPITAL ENCOUNTER (OUTPATIENT)
Dept: MAMMOGRAPHY | Facility: OTHER | Age: 69
Discharge: HOME | End: 2019-10-04
Attending: NURSE PRACTITIONER | Admitting: NURSE PRACTITIONER
Payer: MEDICARE

## 2019-10-04 DIAGNOSIS — Z12.31 VISIT FOR SCREENING MAMMOGRAM: ICD-10-CM

## 2019-10-04 PROCEDURE — 77063 BREAST TOMOSYNTHESIS BI: CPT

## 2019-10-16 ENCOUNTER — OFFICE VISIT (OUTPATIENT)
Dept: FAMILY MEDICINE | Facility: OTHER | Age: 69
End: 2019-10-16
Attending: NURSE PRACTITIONER
Payer: COMMERCIAL

## 2019-10-16 VITALS
RESPIRATION RATE: 14 BRPM | BODY MASS INDEX: 29.52 KG/M2 | WEIGHT: 166.6 LBS | TEMPERATURE: 97.9 F | HEART RATE: 88 BPM | DIASTOLIC BLOOD PRESSURE: 66 MMHG | SYSTOLIC BLOOD PRESSURE: 152 MMHG | HEIGHT: 63 IN

## 2019-10-16 DIAGNOSIS — Z23 NEEDS FLU SHOT: ICD-10-CM

## 2019-10-16 DIAGNOSIS — Z23 NEED FOR VACCINATION: ICD-10-CM

## 2019-10-16 DIAGNOSIS — Z98.890 HISTORY OF LOOP ELECTRICAL EXCISION PROCEDURE (LEEP): ICD-10-CM

## 2019-10-16 DIAGNOSIS — Z00.00 ENCOUNTER FOR MEDICARE ANNUAL WELLNESS EXAM: ICD-10-CM

## 2019-10-16 DIAGNOSIS — I10 HYPERTENSION, UNSPECIFIED TYPE: Primary | ICD-10-CM

## 2019-10-16 DIAGNOSIS — C51.9 PRIMARY VULVAR SQUAMOUS CELL CARCINOMA (H): ICD-10-CM

## 2019-10-16 DIAGNOSIS — Z79.899 ENCOUNTER FOR MEDICATION REVIEW: ICD-10-CM

## 2019-10-16 LAB
ALBUMIN SERPL-MCNC: 4.9 G/DL (ref 3.5–5.7)
ALP SERPL-CCNC: 104 U/L (ref 34–104)
ALT SERPL W P-5'-P-CCNC: 12 U/L (ref 7–52)
ANION GAP SERPL CALCULATED.3IONS-SCNC: 10 MMOL/L (ref 3–14)
AST SERPL W P-5'-P-CCNC: 14 U/L (ref 13–39)
BILIRUB SERPL-MCNC: 0.4 MG/DL (ref 0.3–1)
BUN SERPL-MCNC: 10 MG/DL (ref 7–25)
CALCIUM SERPL-MCNC: 10 MG/DL (ref 8.6–10.3)
CHLORIDE SERPL-SCNC: 98 MMOL/L (ref 98–107)
CHOLEST SERPL-MCNC: 219 MG/DL
CO2 SERPL-SCNC: 29 MMOL/L (ref 21–31)
CREAT SERPL-MCNC: 0.87 MG/DL (ref 0.6–1.2)
GFR SERPL CREATININE-BSD FRML MDRD: 65 ML/MIN/{1.73_M2}
GLUCOSE SERPL-MCNC: 116 MG/DL (ref 70–105)
HDLC SERPL-MCNC: 62 MG/DL (ref 23–92)
LDLC SERPL CALC-MCNC: 138 MG/DL
NONHDLC SERPL-MCNC: 157 MG/DL
POTASSIUM SERPL-SCNC: 4.7 MMOL/L (ref 3.5–5.1)
PROT SERPL-MCNC: 8 G/DL (ref 6.4–8.9)
SODIUM SERPL-SCNC: 137 MMOL/L (ref 134–144)
TRIGL SERPL-MCNC: 97 MG/DL

## 2019-10-16 PROCEDURE — 90732 PPSV23 VACC 2 YRS+ SUBQ/IM: CPT

## 2019-10-16 PROCEDURE — G0008 ADMIN INFLUENZA VIRUS VAC: HCPCS

## 2019-10-16 PROCEDURE — G0463 HOSPITAL OUTPT CLINIC VISIT: HCPCS

## 2019-10-16 PROCEDURE — G0439 PPPS, SUBSEQ VISIT: HCPCS | Performed by: NURSE PRACTITIONER

## 2019-10-16 PROCEDURE — 90662 IIV NO PRSV INCREASED AG IM: CPT

## 2019-10-16 PROCEDURE — G0009 ADMIN PNEUMOCOCCAL VACCINE: HCPCS

## 2019-10-16 PROCEDURE — 80053 COMPREHEN METABOLIC PANEL: CPT | Mod: ZL | Performed by: NURSE PRACTITIONER

## 2019-10-16 PROCEDURE — 36415 COLL VENOUS BLD VENIPUNCTURE: CPT | Mod: ZL | Performed by: NURSE PRACTITIONER

## 2019-10-16 PROCEDURE — 80061 LIPID PANEL: CPT | Mod: ZL | Performed by: NURSE PRACTITIONER

## 2019-10-16 RX ORDER — FAMOTIDINE 20 MG/1
20 TABLET, FILM COATED ORAL
Qty: 60 TABLET | Refills: 3 | Status: SHIPPED | OUTPATIENT
Start: 2019-10-16 | End: 2020-10-08

## 2019-10-16 RX ORDER — LISINOPRIL 10 MG/1
10 TABLET ORAL DAILY
Qty: 90 TABLET | Refills: 4 | Status: SHIPPED | OUTPATIENT
Start: 2019-10-16 | End: 2020-10-08

## 2019-10-16 RX ORDER — SIMVASTATIN 20 MG
20 TABLET ORAL DAILY
Qty: 90 TABLET | Refills: 0 | Status: SHIPPED | OUTPATIENT
Start: 2019-10-16 | End: 2020-10-08

## 2019-10-16 RX ORDER — LISINOPRIL 10 MG/1
10 TABLET ORAL DAILY
Qty: 90 TABLET | Refills: 4 | Status: SHIPPED | OUTPATIENT
Start: 2019-10-16 | End: 2019-10-16

## 2019-10-16 ASSESSMENT — MIFFLIN-ST. JEOR: SCORE: 1255.85

## 2019-10-16 NOTE — NURSING NOTE
Patient presents to clinic for medicare wellness visit.  Rose Mary Cassidy LPN ....................  10/16/2019   11:19 AM

## 2019-10-16 NOTE — PROGRESS NOTES
SUBJECTIVE:   Irene Saravia is a 69 year old female who presents for Preventive Visit.      Are you in the first 12 months of your Medicare coverage?  No    HPI  Do you feel safe in your environment? Yes    Do you have a Health Care Directive? No: Advance care planning reviewed with patient; information given to patient to review.      Fall risk  Fallen 2 or more times in the past year?: No  Any fall with injury in the past year?: No    Cognitive Screening   1) Repeat 3 items (Leader, Season, Table)    2) Clock draw: NORMAL  3) 3 item recall: Recalls 3 objects  Results: 3 items recalled: COGNITIVE IMPAIRMENT LESS LIKELY    Mini-CogTM Copyright S Ludy. Licensed by the author for use in [facility]; reprinted with permission ([email or web address]). All rights reserved.      Do you have sleep apnea, excessive snoring or daytime drowsiness?: no    Reviewed and updated as needed this visit by clinical staff  Tobacco  Allergies  Meds  Soc Hx        Reviewed and updated as needed this visit by Provider    Presents for yearly medication review, preventative care and wellness care  Recent mammography screening negative no history of abnormal    History of primary squamous cell vulvar cancer has been seeing  Gyn/oncology at [facility] every 6 months  Had a recent vulvar exam with Pap and HPV which were negative and gynecologic/ONC surveillance visit with Dr. Lcuero at [facility]--see epic  Reports 5 years and was told to return as needed  Will need yearly pelvic and vulvar exam  Diagnosed in 2014 had chemotherapy and radiation-    Reports overall health has been good--has been tolerating medications well and checks her blood pressure at home regularly  Feels her blood pressure is elevated during clinic visits only--she is adamant that it is running 120s-130s overall systolically at home                  Social History     Tobacco Use     Smoking status: Former Smoker     Packs/day: 0.25     Years: 40.00      Pack years: 10.00     Types: Cigarettes     Last attempt to quit: 2015     Years since quittin.1     Smokeless tobacco: Never Used     Tobacco comment: Quit smoking: It's been over one year since quitting   Substance Use Topics     Alcohol use: No                   Current providers sharing in care for this patient include:  Patient Care Team:  Hallie Vargas APRN CNP as PCP - General (Nurse Practitioner)  Hallie Vargas APRN CNP as Assigned PCP    The following health maintenance items are reviewed in Epic and correct as of today:  Health Maintenance   Topic Date Due     DEXA  1950     HEPATITIS C SCREENING  1950     ADVANCE CARE PLANNING  1950     ZOSTER IMMUNIZATION (1 of 2) 2002     DTAP/TDAP/TD IMMUNIZATION (1 - Tdap) 2004     MEDICARE ANNUAL WELLNESS VISIT  2017     FALL RISK ASSESSMENT  2017     PNEUMOCOCCAL IMMUNIZATION 65+ HIGH/HIGHEST RISK (1 of 2 - PCV13) 2017     PHQ-2  2019     INFLUENZA VACCINE (1) 2019     MAMMO SCREENING  10/04/2021     LIPID  10/02/2023     COLONOSCOPY  10/31/2026     IPV IMMUNIZATION  Aged Out     MENINGITIS IMMUNIZATION  Aged Out     Lab work is in process      Results for orders placed or performed in visit on 10/16/19   Comprehensive Metabolic Panel   Result Value Ref Range    Sodium 137 134 - 144 mmol/L    Potassium 4.7 3.5 - 5.1 mmol/L    Chloride 98 98 - 107 mmol/L    Carbon Dioxide 29 21 - 31 mmol/L    Anion Gap 10 3 - 14 mmol/L    Glucose 116 (H) 70 - 105 mg/dL    Urea Nitrogen 10 7 - 25 mg/dL    Creatinine 0.87 0.60 - 1.20 mg/dL    GFR Estimate 65 >60 mL/min/[1.73_m2]    GFR Estimate If Black 79 >60 mL/min/[1.73_m2]    Calcium 10.0 8.6 - 10.3 mg/dL    Bilirubin Total 0.4 0.3 - 1.0 mg/dL    Albumin 4.9 3.5 - 5.7 g/dL    Protein Total 8.0 6.4 - 8.9 g/dL    Alkaline Phosphatase 104 34 - 104 U/L    ALT 12 7 - 52 U/L    AST 14 13 - 39 U/L   Lipid Panel   Result Value Ref Range    Cholesterol 219 (H) <200  "mg/dL    Triglycerides 97 <150 mg/dL    HDL Cholesterol 62 23 - 92 mg/dL    LDL Cholesterol Calculated 138 (H) <100 mg/dL    Non HDL Cholesterol 157 (H) <130 mg/dL       Review of Systems   Genitourinary: Positive for genital sores.   All other systems reviewed and are negative.      OBJECTIVE:   BP (!) 152/66   Pulse 88   Temp 97.9  F (36.6  C)   Resp 14   Ht 1.594 m (5' 2.75\")   Wt 75.6 kg (166 lb 9.6 oz)   Breastfeeding? No   BMI 29.75 kg/m   Estimated body mass index is 29.75 kg/m  as calculated from the following:    Height as of this encounter: 1.594 m (5' 2.75\").    Weight as of this encounter: 75.6 kg (166 lb 9.6 oz).  Physical Exam  Vitals signs and nursing note reviewed.   Constitutional:       Appearance: Normal appearance.   HENT:      Head: Normocephalic and atraumatic.   Neck:      Musculoskeletal: Normal range of motion and neck supple.   Cardiovascular:      Rate and Rhythm: Normal rate and regular rhythm.   Pulmonary:      Effort: Pulmonary effort is normal.      Breath sounds: Normal breath sounds.   Abdominal:      General: Bowel sounds are normal.   Musculoskeletal: Normal range of motion.   Skin:     General: Skin is warm and dry.   Neurological:      Mental Status: She is alert and oriented to person, place, and time.   Psychiatric:         Mood and Affect: Mood normal.         Behavior: Behavior normal.         Thought Content: Thought content normal.         Judgement: Judgment normal.         Diagnostic Test Results:  Labs and imaging reviewed in [facility]  Recent oncology notes and labs reviewed in [facility] during office visit    ASSESSMENT / PLAN:   1. Hypertension, unspecified type    Blood pressure overall has been stable--some elevations during clinic visits  We discussed increasing lisinopril from 10 mg to 20 mg a day patient is adamant that her blood pressure is under good control at home fluctuates only during clinic visits  She will monitor her blood pressure--we discussed target " range 120s to 140 systolically--- if 150 or above persistently she should return for medication adjustment    - Comprehensive Metabolic Panel; Future  - lisinopril (PRINIVIL/ZESTRIL) 10 MG tablet; Take 1 tablet (10 mg) by mouth daily  Dispense: 90 tablet; Refill: 4  - Comprehensive Metabolic Panel  - simvastatin (ZOCOR) 20 MG tablet; Take 1 tablet (20 mg) by mouth daily  Dispense: 90 tablet; Refill: 0    2. Needs flu shot    - GH IMM-  FLU VACCINE, INCREASED ANTIGEN, PRESV FREE    3. Encounter for medication review    - Lipid Panel; Future  - Comprehensive Metabolic Panel; Future  - famotidine (PEPCID) 20 MG tablet; Take 1 tablet (20 mg) by mouth 2 times daily (before meals)  Dispense: 60 tablet; Refill: 3  - lisinopril (PRINIVIL/ZESTRIL) 10 MG tablet; Take 1 tablet (10 mg) by mouth daily  Dispense: 90 tablet; Refill: 4  - Comprehensive Metabolic Panel  - Lipid Panel    4. Need for vaccination    - PNEUMOCOCCAL VACCINE,ADULT,SQ OR IM    5. History of loop electrical excision procedure (LEEP)      6. Primary vulvar squamous cell carcinoma (H)  Initial  diagnosis 2014, had her last 5-year follow-up in exam with Dr. Lucero gynecology oncology  Recommend yearly primary care visits with pelvic and vulvar exam  Patient has atrophic and dermatitis changes from radiation therapy  She will be due in 1 year      7. Lipidemia--patient prefers simvastatin will return for follow-up lipids in 3 months        The 10-year ASCVD risk score (Emmysandy DIALLO Jr., et al., 2013) is: 12.8%    Values used to calculate the score:      Age: 69 years      Sex: Female      Is Non- : No      Diabetic: No      Tobacco smoker: No      Systolic Blood Pressure: 152 mmHg      Is BP treated: Yes      HDL Cholesterol: 62 mg/dL      Total Cholesterol: 219 mg/dL      End of Life Planning:  Patient currently has an advanced directive: Yes.  Practitioner is supportive of decision.    COUNSELING:  Reviewed preventive health counseling, as  "reflected in patient instructions       Regular exercise       Healthy diet/nutrition       Vision screening       Dental care       Fall risk prevention       Immunizations    Vaccinated for: Influenza and Pneumococcal          Estimated body mass index is 29.75 kg/m  as calculated from the following:    Height as of this encounter: 1.594 m (5' 2.75\").    Weight as of this encounter: 75.6 kg (166 lb 9.6 oz).         reports that she quit smoking about 4 years ago. Her smoking use included cigarettes. She has a 10.00 pack-year smoking history. She has never used smokeless tobacco.      Appropriate preventive services were discussed with this patient, including applicable screening as appropriate for cardiovascular disease, diabetes, osteopenia/osteoporosis, and glaucoma.  As appropriate for age/gender, discussed screening for colorectal cancer, prostate cancer, breast cancer, and cervical cancer. Checklist reviewing preventive services available has been given to the patient.    Reviewed patients plan of care and provided an AVS. The Basic Care Plan (routine screening as documented in Health Maintenance) for Irene meets the Care Plan requirement. This Care Plan has been established and reviewed with the Patient.    Counseling Resources:  ATP IV Guidelines  Pooled Cohorts Equation Calculator  Breast Cancer Risk Calculator  FRAX Risk Assessment  ICSI Preventive Guidelines  Dietary Guidelines for Americans, 2010  USDA's MyPlate  ASA Prophylaxis  Lung CA Screening    TERRIE Logan CNP  [facility]    Identified Health Risks:  "

## 2019-10-16 NOTE — LETTER
October 16, 2019      Irene Saravia  [address] [facility] [address]        Dear ,    I am happy to report your labs show normal indicators of kidney and liver function with normal electrolytes.  Your lipids and cholesterol are above target range for your age and history  And I would recommend starting the simvastatin at 20 mg daily  With a follow-up in 3 months cholesterol lab recheck.  I will send the simvastatin to the pharmacy.    I did speak with radiology and they said the guidelines have changed from Medicare  And yearly mammography needs to be 366 days apart, this is a change from last year.    The 10-year ASCVD risk score (Emmy DIALLO JrMelquiades, et al., 2013) is: 12.8%    Values used to calculate the score:      Age: 69 years      Sex: Female      Is Non- : No      Diabetic: No      Tobacco smoker: No      Systolic Blood Pressure: 152 mmHg      Is BP treated: Yes      HDL Cholesterol: 62 mg/dL      Total Cholesterol: 219 mg/dL    Results for orders placed or performed in visit on 10/16/19   Comprehensive Metabolic Panel   Result Value Ref Range    Sodium 137 134 - 144 mmol/L    Potassium 4.7 3.5 - 5.1 mmol/L    Chloride 98 98 - 107 mmol/L    Carbon Dioxide 29 21 - 31 mmol/L    Anion Gap 10 3 - 14 mmol/L    Glucose 116 (H) 70 - 105 mg/dL    Urea Nitrogen 10 7 - 25 mg/dL    Creatinine 0.87 0.60 - 1.20 mg/dL    GFR Estimate 65 >60 mL/min/[1.73_m2]    GFR Estimate If Black 79 >60 mL/min/[1.73_m2]    Calcium 10.0 8.6 - 10.3 mg/dL    Bilirubin Total 0.4 0.3 - 1.0 mg/dL    Albumin 4.9 3.5 - 5.7 g/dL    Protein Total 8.0 6.4 - 8.9 g/dL    Alkaline Phosphatase 104 34 - 104 U/L    ALT 12 7 - 52 U/L    AST 14 13 - 39 U/L   Lipid Panel   Result Value Ref Range    Cholesterol 219 (H) <200 mg/dL    Triglycerides 97 <150 mg/dL    HDL Cholesterol 62 23 - 92 mg/dL    LDL Cholesterol Calculated 138 (H) <100 mg/dL    Non HDL Cholesterol 157 (H) <130 mg/dL             If you have any  questions or concerns, please call the clinic at the number listed above.       Sincerely,        TERRIE Logan CNP

## 2020-10-08 ENCOUNTER — OFFICE VISIT (OUTPATIENT)
Dept: FAMILY MEDICINE | Facility: OTHER | Age: 70
End: 2020-10-08
Attending: PHYSICIAN ASSISTANT
Payer: MEDICARE

## 2020-10-08 ENCOUNTER — HOSPITAL ENCOUNTER (OUTPATIENT)
Dept: MAMMOGRAPHY | Facility: OTHER | Age: 70
End: 2020-10-08
Attending: NURSE PRACTITIONER
Payer: MEDICARE

## 2020-10-08 VITALS
WEIGHT: 173 LBS | TEMPERATURE: 97.2 F | DIASTOLIC BLOOD PRESSURE: 84 MMHG | RESPIRATION RATE: 18 BRPM | SYSTOLIC BLOOD PRESSURE: 190 MMHG | HEART RATE: 102 BPM | HEIGHT: 61 IN | BODY MASS INDEX: 32.66 KG/M2

## 2020-10-08 DIAGNOSIS — E78.5 HYPERLIPIDEMIA LDL GOAL <100: ICD-10-CM

## 2020-10-08 DIAGNOSIS — R12 HEARTBURN: ICD-10-CM

## 2020-10-08 DIAGNOSIS — Z23 NEEDS FLU SHOT: ICD-10-CM

## 2020-10-08 DIAGNOSIS — I10 HYPERTENSION, UNSPECIFIED TYPE: ICD-10-CM

## 2020-10-08 DIAGNOSIS — Z00.00 ENCOUNTER FOR MEDICARE ANNUAL WELLNESS EXAM: Primary | ICD-10-CM

## 2020-10-08 DIAGNOSIS — Z12.31 VISIT FOR SCREENING MAMMOGRAM: ICD-10-CM

## 2020-10-08 DIAGNOSIS — R73.09 ELEVATED GLUCOSE: ICD-10-CM

## 2020-10-08 LAB
ANION GAP SERPL CALCULATED.3IONS-SCNC: 10 MMOL/L (ref 3–14)
BUN SERPL-MCNC: 16 MG/DL (ref 7–25)
CALCIUM SERPL-MCNC: 9.7 MG/DL (ref 8.6–10.3)
CHLORIDE SERPL-SCNC: 98 MMOL/L (ref 98–107)
CO2 SERPL-SCNC: 28 MMOL/L (ref 21–31)
CREAT SERPL-MCNC: 0.86 MG/DL (ref 0.6–1.2)
GFR SERPL CREATININE-BSD FRML MDRD: 66 ML/MIN/{1.73_M2}
GLUCOSE SERPL-MCNC: 127 MG/DL (ref 70–105)
POTASSIUM SERPL-SCNC: 3.8 MMOL/L (ref 3.5–5.1)
SODIUM SERPL-SCNC: 136 MMOL/L (ref 134–144)

## 2020-10-08 PROCEDURE — 77067 SCR MAMMO BI INCL CAD: CPT

## 2020-10-08 PROCEDURE — 36415 COLL VENOUS BLD VENIPUNCTURE: CPT | Mod: ZL | Performed by: PHYSICIAN ASSISTANT

## 2020-10-08 PROCEDURE — G0439 PPPS, SUBSEQ VISIT: HCPCS | Performed by: PHYSICIAN ASSISTANT

## 2020-10-08 PROCEDURE — G0008 ADMIN INFLUENZA VIRUS VAC: HCPCS

## 2020-10-08 PROCEDURE — 80048 BASIC METABOLIC PNL TOTAL CA: CPT | Mod: ZL | Performed by: PHYSICIAN ASSISTANT

## 2020-10-08 PROCEDURE — G0463 HOSPITAL OUTPT CLINIC VISIT: HCPCS

## 2020-10-08 RX ORDER — LISINOPRIL 10 MG/1
10 TABLET ORAL DAILY
Qty: 90 TABLET | Refills: 3 | Status: SHIPPED | OUTPATIENT
Start: 2020-10-08 | End: 2021-10-06

## 2020-10-08 RX ORDER — SIMVASTATIN 20 MG
20 TABLET ORAL DAILY
Qty: 90 TABLET | Refills: 3 | Status: SHIPPED | OUTPATIENT
Start: 2020-10-08 | End: 2021-10-06

## 2020-10-08 RX ORDER — FAMOTIDINE 20 MG/1
20 TABLET, FILM COATED ORAL
Qty: 180 TABLET | Refills: 3 | Status: SHIPPED | OUTPATIENT
Start: 2020-10-08 | End: 2021-10-06

## 2020-10-08 ASSESSMENT — MIFFLIN-ST. JEOR: SCORE: 1252.1

## 2020-10-08 NOTE — PROGRESS NOTES
"SUBJECTIVE:   Irene Saravia is a 70 year old female who presents for Preventive Visit.      Patient has been advised of split billing requirements and indicates understanding: Yes  Are you in the first 12 months of your Medicare Part B coverage?  No    Physical Health:    In general, how would you rate your overall physical health? excellent    Outside of work, how many days during the week do you exercise? 2-3 days/week    Outside of work, approximately how many minutes a day do you exercise?15-30 minutes    If you drink alcohol do you typically have >3 drinks per day or >7 drinks per week? Not Applicable    Do you usually eat at least 4 servings of fruit and vegetables a day, include whole grains & fiber and avoid regularly eating high fat or \"junk\" foods? NO    Do you have any problems taking medications regularly?  No    Do you have any side effects from medications? none    Needs assistance for the following daily activities: no assistance needed    Which of the following safety concerns are present in your home?  none identified     Hearing impairment: No    In the past 6 months, have you been bothered by leaking of urine? no    Mental Health:    In general, how would you rate your overall mental or emotional health? excellent  PHQ-2 Score: 0    Do you feel safe in your environment? Yes    Have you ever done Advance Care Planning? (For example, a Health Directive, POLST, or a discussion with a medical provider or your loved ones about your wishes): No, advance care planning information given to patient to review.  Patient plans to discuss their wishes with loved ones or provider.      Additional concerns to address?  No    Fall risk:  Fallen 2 or more times in the past year?: No  Any fall with injury in the past year?: No    Cognitive Screenin) Repeat 3 items (Leader, Season, Table)    2) Clock draw: NORMAL  3) 3 item recall: Recalls 3 objects  Results: 3 items recalled: COGNITIVE IMPAIRMENT LESS " LIKELY    Mini-CogTM Copyright ERICH Boston. Licensed by the author for use in [facility]; reprinted with permission (kasey@.[email or web address]). All rights reserved.      Do you have sleep apnea, excessive snoring or daytime drowsiness?: no          HTN: on lisinopril.  Elevates at the doctor office.  138/73 at home.    127/75. 120-130s for systolic.      Want to restart simvastatin. Only took for 3 months.      Reviewed and updated as needed this visit by clinical staff  Tobacco  Allergies  Meds  Problems  Med Hx  Surg Hx  Fam Hx          Reviewed and updated as needed this visit by Provider  Tobacco  Allergies  Meds  Problems  Med Hx  Surg Hx  Fam Hx         Social History     Tobacco Use     Smoking status: Former Smoker     Packs/day: 0.25     Years: 40.00     Pack years: 10.00     Types: Cigarettes     Quit date: 2015     Years since quittin.1     Smokeless tobacco: Never Used     Tobacco comment: Quit smoking: It's been over one year since quitting   Substance Use Topics     Alcohol use: No                           Current providers sharing in care for this patient include:   Patient Care Team:  No Ref-Primary, Physician as PCP - Hallie Collazo APRN CNP as Assigned PCP    The following health maintenance items are reviewed in Epic and correct as of today:  Health Maintenance   Topic Date Due     DEXA  1950     HEPATITIS C SCREENING  1950     ZOSTER IMMUNIZATION (1 of 2) 2002     MEDICARE ANNUAL WELLNESS VISIT  10/08/2021     FALL RISK ASSESSMENT  10/08/2021     MAMMO SCREENING  10/08/2022     DTAP/TDAP/TD IMMUNIZATION (3 - Td) 2024     LIPID  10/16/2024     ADVANCE CARE PLANNING  10/08/2025     COLORECTAL CANCER SCREENING  10/31/2026     PHQ-2  Completed     INFLUENZA VACCINE  Completed     Pneumococcal Vaccine: 65+ Years  Completed     Pneumococcal Vaccine: Pediatrics (0 to 5 Years) and At-Risk Patients (6 to 64 Years)  Aged Out     IPV IMMUNIZATION  Aged  Out     MENINGITIS IMMUNIZATION  Aged Out     HEPATITIS B IMMUNIZATION  Aged Out     Labs reviewed in EPIC  BP Readings from Last 3 Encounters:   10/08/20 (!) 190/84   10/16/19 (!) 152/66   10/02/18 140/82    Wt Readings from Last 3 Encounters:   10/08/20 78.5 kg (173 lb)   10/16/19 75.6 kg (166 lb 9.6 oz)   10/02/18 75.6 kg (166 lb 9.6 oz)                  Patient Active Problem List   Diagnosis     Breast cancer screening     DNR (do not resuscitate)     Family history of hypertension     Health care maintenance     Displacement of intervertebral disc     History of loop electrical excision procedure (LEEP)     History of tobacco use     Papanicolaou smear of cervix with atypical squamous cells of undetermined significance (ASC-US)     Primary vulvar squamous cell carcinoma (H)     Seborrheic keratoses, inflamed     Tobacco abuse     Past Surgical History:   Procedure Laterality Date     COLONOSCOPY  10/31/2016    10/31/2016,F/U  normal     OTHER SURGICAL HISTORY      ,[id number],COLPOSCOPY,LEEP, [place]     OTHER SURGICAL HISTORY      ,NECK/THORAX PROCEDURE,Reduction of herniated disc     OTHER SURGICAL HISTORY      2014,2083,RADIATION TREATMENT,vulvar cancer, chemotherapy       Social History     Tobacco Use     Smoking status: Former Smoker     Packs/day: 0.25     Years: 40.00     Pack years: 10.00     Types: Cigarettes     Quit date: 2015     Years since quittin.1     Smokeless tobacco: Never Used     Tobacco comment: Quit smoking: It's been over one year since quitting   Substance Use Topics     Alcohol use: No     Family History   Problem Relation Age of Onset     Diabetes Father         Diabetes     Other - See Comments Father         Psychiatric illness,Alzheimer's     Breast Cancer Sister 40        Cancer-breast     Family History Negative Mother         Good Health     Hypertension Mother         Hypertension     Family History Negative Sister         Good Health     Diabetes  "Sister         Diabetes     Family History Negative Brother         Good Health,x3     Diabetes Brother         Diabetes     Diabetes Other         Diabetes,Positive for diabetes mellitus type 2     Colon Cancer Paternal Uncle 80        Cancer-colon     Ovarian Cancer No family hx of         Cancer-ovarian     Prostate Cancer No family hx of         Cancer-prostate     Heart Disease No family hx of         Heart Disease     Thyroid Disease No family hx of         Thyroid Disease     Blood Disease No family hx of         Blood Disease         Current Outpatient Medications   Medication Sig Dispense Refill     famotidine (PEPCID) 20 MG tablet Take 1 tablet (20 mg) by mouth 2 times daily (before meals) 180 tablet 3     lisinopril (ZESTRIL) 10 MG tablet Take 1 tablet (10 mg) by mouth daily 90 tablet 3     simvastatin (ZOCOR) 20 MG tablet Take 1 tablet (20 mg) by mouth daily 90 tablet 3     Allergies   Allergen Reactions     Ascorbate Other (See Comments)     Cold sores     Recent Labs   Lab Test 10/08/20  1116 10/16/19  1217 10/02/18  1254 10/02/17  1659 10/02/17  1659   LDL  --  138* 153*  --  121*   HDL  --  62 57  --  57   TRIG  --  97 108  --  87   ALT  --  12  --   --   --    CR 0.86 0.87 0.70  --  0.69*   GFRESTIMATED 66 65 84   < >  --    GFRESTBLACK 79 79 >90  --  >60   POTASSIUM 3.8 4.7 4.2  --  4.6    < > = values in this interval not displayed.      Pneumonia Vaccine:UTD  Mammogram Screening: 10/8/2020  Last 3 Pap and HPV Results:      ROS:  Constitutional, HEENT, cardiovascular, pulmonary, GI, , musculoskeletal, neuro, skin, endocrine and psych systems are negative, except as otherwise noted.    OBJECTIVE:   BP (!) 190/84 (BP Location: Right arm, Patient Position: Sitting, Cuff Size: Adult Regular)   Pulse 102   Temp 97.2  F (36.2  C)   Resp 18   Ht 1.549 m (5' 1\")   Wt 78.5 kg (173 lb)   BMI 32.69 kg/m   Estimated body mass index is 32.69 kg/m  as calculated from the following:    Height as of this " "encounter: 1.549 m (5' 1\").    Weight as of this encounter: 78.5 kg (173 lb).  EXAM:   GENERAL APPEARANCE: healthy, alert and no distress  EYES: Eyes grossly normal to inspection, PERRL and conjunctivae and sclerae normal  HENT: ear canals and TM's normal, nose and mouth without ulcers or lesions, oropharynx clear and oral mucous membranes moist  NECK: no adenopathy, no asymmetry, masses, or scars and thyroid normal to palpation  RESP: lungs clear to auscultation - no rales, rhonchi or wheezes  BREAST: declines exam  CV: regular rate and rhythm, normal S1 S2, no S3 or S4, no murmur, click or rub, no peripheral edema and peripheral pulses strong  ABDOMEN: soft, nontender, no hepatosplenomegaly, no masses and bowel sounds normal  MS: no musculoskeletal defects are noted and gait is age appropriate without ataxia  SKIN: no suspicious lesions or rashes  NEURO: Normal strength and tone, sensory exam grossly normal, mentation intact and speech normal  PSYCH: mentation appears normal and affect normal/bright    Diagnostic Test Results:  Labs reviewed in Epic  Results for orders placed or performed during the hospital encounter of 10/08/20   MA Screen Bilateral w/Petra     Status: None    Narrative    EXAM: MA SCREENING BILATERAL W/ PETRA, 10/8/2020 10:18 AM    COMPARISONS: 10/4/2018 through 9/28/2016    HISTORY:70 years  Female  yes  Visit for screening mammogram    FINDINGS: No suspicious mass or microcalcification is seen in either  breast.    BREAST DENSITY: Heterogeneously dense.      Impression    IMPRESSION: BI-RADS CATEGORY: 1 -  Negative.    RECOMMENDED FOLLOW-UP: Annual Mammography.      DOROTHY PANTOJA MD   Results for orders placed or performed in visit on 10/08/20   Basic Metabolic Panel     Status: Abnormal   Result Value Ref Range    Sodium 136 134 - 144 mmol/L    Potassium 3.8 3.5 - 5.1 mmol/L    Chloride 98 98 - 107 mmol/L    Carbon Dioxide 28 21 - 31 mmol/L    Anion Gap 10 3 - 14 mmol/L    Glucose 127 (H) " 70 - 105 mg/dL    Urea Nitrogen 16 7 - 25 mg/dL    Creatinine 0.86 0.60 - 1.20 mg/dL    GFR Estimate 66 >60 mL/min/[1.73_m2]    GFR Estimate If Black 79 >60 mL/min/[1.73_m2]    Calcium 9.7 8.6 - 10.3 mg/dL       ASSESSMENT / PLAN:       ICD-10-CM    1. Encounter for Medicare annual wellness exam  Z00.00    2. Hypertension, unspecified type  I10 lisinopril (ZESTRIL) 10 MG tablet     simvastatin (ZOCOR) 20 MG tablet     Basic Metabolic Panel     Basic Metabolic Panel   3. Needs flu shot  Z23 GH IMM-  FLU VACCINE, INCREASED ANTIGEN, PRESV FREE   4. Heartburn  R12 famotidine (PEPCID) 20 MG tablet   5. Hyperlipidemia LDL goal <100  E78.5 simvastatin (ZOCOR) 20 MG tablet   6. Elevated glucose  R73.09 CANCELED: Hemoglobin A1c     Hypertension: Refilled lisinopril.  Completed BMP for monitoring.  Patient's blood pressure is elevated in clinic.  Declines changing her blood pressure medication dose at this time.  Encourage close monitoring.  Blood pressure is elevated today. Encouraged to monitor blood pressure a couple times a week over the next few weeks. Return in 1-2 weeks if blood pressure is persistently elevated for a recheck appointment. Work on diet and exercise to decrease blood pressure. Weight loss is helpful.  Decrease salt intake. Gave warning signs/symptoms.     Patient was given a flu shot.    Hyperlipidemia: Refill simvastatin.  Patient declines having lipid profile checked at this time.  Encourage good diet, exercise and weight loss in order to decrease her future risk of heart disease.    Heartburn: Refilled Pepcid.  No acute concerns at this time.  Recheck in 1 year for monitoring.    Patient's glucose was found to be borderline.  Unable to add a hemoglobin A1c to the labs at this time.  Encourage good diet, exercise and decreasing sugar in your diet in order to reduce your future risk of diabetes.      Patient has been advised of split billing requirements and indicates understanding:  "Yes    COUNSELING:  Reviewed preventive health counseling, as reflected in patient instructions       Regular exercise       Healthy diet/nutrition       Vision screening       Hearing screening       Dental care       Fall risk prevention       Osteoporosis Prevention/Bone Health       Colon cancer screening       Advanced Planning     Estimated body mass index is 32.69 kg/m  as calculated from the following:    Height as of this encounter: 1.549 m (5' 1\").    Weight as of this encounter: 78.5 kg (173 lb).    Weight management plan: Discussed healthy diet and exercise guidelines    She reports that she quit smoking about 5 years ago. Her smoking use included cigarettes. She has a 10.00 pack-year smoking history. She has never used smokeless tobacco.    Appropriate preventive services were discussed with this patient, including applicable screening as appropriate for cardiovascular disease, diabetes, osteopenia/osteoporosis, and glaucoma.  As appropriate for age/gender, discussed screening for colorectal cancer, prostate cancer, breast cancer, and cervical cancer. Checklist reviewing preventive services available has been given to the patient.    Reviewed patients plan of care and provided an AVS. The Basic Care Plan (routine screening as documented in Health Maintenance) for Irene meets the Care Plan requirement. This Care Plan has been established and reviewed with the Patient.    Counseling Resources:  ATP IV Guidelines  Pooled Cohorts Equation Calculator  Breast Cancer Risk Calculator  BRCA-Related Cancer Risk Assessment: FHS-7 Tool  FRAX Risk Assessment  ICSI Preventive Guidelines  Dietary Guidelines for Americans, 2010  USDA's MyPlate  ASA Prophylaxis  Lung CA Screening    Patient Instructions     Healthy Strategies  1. Eat at least 3 meals a day and never skip breakfast.  2. Eat more slowly.  3. Decrease portion size.  4. Provide structure by using meal replacement bars or shakes, and/or low calorie frozen " "meals.  5. For good nutrition incorporate fruit, vegetables, whole grains, lean protein, and low-fat dairy.  6. Remove trigger foods from yourenvironment to avoid impulse eating.  7. Increase physical activity: get a pedometer and aim for 10,000 steps a day or 30-35 minutes of activity 5 days per week.  8. Weigh yourself daily or at least weekly.  9. Keep a record of what you eat and your activity.  10. Establish a support system such as afriend, group or program.    11. Read Mookie Serrano's \"Eat to Live\". Remember it is important to have a minimum of 1200 calories a day, okay to use olive oil, 40 grams of fiber daily. No more than two servings (the size of your palm) of red meat a week.     Please consider the following general health recommendations:    Schedule a mammogram annually starting at the age of 40 years old unless recommended earlier by your primary care provider. Come for a general exam once yearly.    Eat a quality diet (generally, low in simple sugars, starches, cholesterol and saturated fat.)    Please get 1200 mg of calcium in divided doses with 800 units vitamin D in your diet daily. Take supplements as needed to obtain full recommended amounts.     Stay physically active. Regular walking or other exercise is one of the best ways to minimize pain of arthritis; maintain independence and mobility; maintain bone strength; maintain conditioning of your heart. Find something you enjoy and a friend to do it with you.    Maintain ideal weight. Your Body mass index is Body mass index is 32.69 kg/m .. Generally a BMI of 20-25 is considered ideal. Overweight is defined as 25-30, obese is 30-35 and markedly obese is greater than 35.    Apply sun block (SPF 25 or greater) on exposed skin anytime you are out in the sun to prevent skin cancer.     Wear a seatbelt whenever you are in a car.    Consider a bone density study every 2-5 years to see if you are at increased risk for fracture. If you have osteoporosis, " medicine to strengthen your bones can significantly reduce your risk of fracture, back pain, and loss of height.    Colonoscopy (an exam of the colon) is recommended every 10 years after the age of 50 to screen for colon cancer. (More often if you are at increased risk.)    Obtain a flu shot every fall.    Receive a pneumonia shot series after the age of 65 (repeat in 5 years if you have other risk factors). This does not prevent all types of pneumonia, but reduces the risk of the worst bacterial causes of pneumonia.    You should have a tetanus booster at least once every 10 years.    A vaccine to reduce your chances of getting shingles is available if you are over 50. Information about the vaccine is available through the clinic or at:  (https://www.cdc.gov/vaccines/vpd/shingles/public/shingrix/index.html)    Check blood sugar annually. Cholesterol annually unless you have had a normal level when last checked within 5 years.     I recommend that you have a general physical exam every year.  Patient Education   Personalized Prevention Plan  You are due for the preventive services outlined below.  Your care team is available to assist you in scheduling these services.  If you have already completed any of these items, please share that information with your care team to update in your medical record.  Health Maintenance Due   Topic Date Due     Osteoporosis Screening  1950     Hepatitis C Screening  1950     Zoster (Shingles) Vaccine (1 of 2) 01/02/2002           Doris Hughes PA-C  [facility]

## 2020-10-08 NOTE — PATIENT INSTRUCTIONS
"Healthy Strategies  1. Eat at least 3 meals a day and never skip breakfast.  2. Eat more slowly.  3. Decrease portion size.  4. Provide structure by using meal replacement bars or shakes, and/or low calorie frozen meals.  5. For good nutrition incorporate fruit, vegetables, whole grains, lean protein, and low-fat dairy.  6. Remove trigger foods from yourenvironment to avoid impulse eating.  7. Increase physical activity: get a pedometer and aim for 10,000 steps a day or 30-35 minutes of activity 5 days per week.  8. Weigh yourself daily or at least weekly.  9. Keep a record of what you eat and your activity.  10. Establish a support system such as afriend, group or program.    11. Read Mookie Serrano's \"Eat to Live\". Remember it is important to have a minimum of 1200 calories a day, okay to use olive oil, 40 grams of fiber daily. No more than two servings (the size of your palm) of red meat a week.     Please consider the following general health recommendations:    Schedule a mammogram annually starting at the age of 40 years old unless recommended earlier by your primary care provider. Come for a general exam once yearly.    Eat a quality diet (generally, low in simple sugars, starches, cholesterol and saturated fat.)    Please get 1200 mg of calcium in divided doses with 800 units vitamin D in your diet daily. Take supplements as needed to obtain full recommended amounts.     Stay physically active. Regular walking or other exercise is one of the best ways to minimize pain of arthritis; maintain independence and mobility; maintain bone strength; maintain conditioning of your heart. Find something you enjoy and a friend to do it with you.    Maintain ideal weight. Your Body mass index is Body mass index is 32.69 kg/m .. Generally a BMI of 20-25 is considered ideal. Overweight is defined as 25-30, obese is 30-35 and markedly obese is greater than 35.    Apply sun block (SPF 25 or greater) on exposed skin anytime you " are out in the sun to prevent skin cancer.     Wear a seatbelt whenever you are in a car.    Consider a bone density study every 2-5 years to see if you are at increased risk for fracture. If you have osteoporosis, medicine to strengthen your bones can significantly reduce your risk of fracture, back pain, and loss of height.    Colonoscopy (an exam of the colon) is recommended every 10 years after the age of 50 to screen for colon cancer. (More often if you are at increased risk.)    Obtain a flu shot every fall.    Receive a pneumonia shot series after the age of 65 (repeat in 5 years if you have other risk factors). This does not prevent all types of pneumonia, but reduces the risk of the worst bacterial causes of pneumonia.    You should have a tetanus booster at least once every 10 years.    A vaccine to reduce your chances of getting shingles is available if you are over 50. Information about the vaccine is available through the clinic or at:  (https://www.cdc.gov/vaccines/vpd/shingles/public/shingrix/index.html)    Check blood sugar annually. Cholesterol annually unless you have had a normal level when last checked within 5 years.     I recommend that you have a general physical exam every year.  Patient Education   Personalized Prevention Plan  You are due for the preventive services outlined below.  Your care team is available to assist you in scheduling these services.  If you have already completed any of these items, please share that information with your care team to update in your medical record.  Health Maintenance Due   Topic Date Due     Osteoporosis Screening  1950     Hepatitis C Screening  1950     Zoster (Shingles) Vaccine (1 of 2) 01/02/2002

## 2020-10-08 NOTE — LETTER
October 8, 2020       Irene Saravia  [address] [id number]        Dear ,    We are writing to inform you of your test results.    Your electrolytes and kidney function are stable.    Your blood sugar was elevated at 127.  I would highly recommend working on good diet, exercise, decreasing sugar and carbohydrates in your diet and losing weight in order to reduce your future risk of diabetes.  At your recheck appointment in 1 year we will need to repeat your blood sugar tests to ensure that it is decreasing closer to the normal range.    Consider the following general recommendations to keep your blood sugar in a good range:    1. Lose weight - Losing 5 to 10 percent of your body weight can lower your risk a lot. If you weigh 200 pounds, that means you should lose 10 to 20 pounds. If you weigh 150 pounds, that means you should lose 7 to 15 pounds.     2. Eat right - Choose a diet rich in fruits, vegetables, and low-fat dairy products, but low in meats, sweets, and refined grains. Stay away from sweet drinks, like soda and juice.     3. Be active for 30 minutes a day - You don t have to go to the gym or break a sweat to get a benefit. Walking, gardening, and dancing are all activities that can help.     4. Quit smoking - If you smoke, ask your doctor or nurse for advice on how to quit. People are much more likely to succeed if they have help and get medicines to help them quit.         Resulted Orders   Basic Metabolic Panel   Result Value Ref Range    Sodium 136 134 - 144 mmol/L    Potassium 3.8 3.5 - 5.1 mmol/L    Chloride 98 98 - 107 mmol/L    Carbon Dioxide 28 21 - 31 mmol/L    Anion Gap 10 3 - 14 mmol/L    Glucose 127 (H) 70 - 105 mg/dL    Urea Nitrogen 16 7 - 25 mg/dL    Creatinine 0.86 0.60 - 1.20 mg/dL    GFR Estimate 66 >60 mL/min/[1.73_m2]    GFR Estimate If Black 79 >60 mL/min/[1.73_m2]    Calcium 9.7 8.6 - 10.3 mg/dL       If you have any questions or concerns, please call  the clinic at the number listed above.       Sincerely,        Doris Hughes PA-C

## 2020-10-08 NOTE — NURSING NOTE
.  Chief Complaint   Patient presents with     Wellness Visit         Medication Reconciliation: complete    Angela Hernandez, LPN

## 2020-10-12 ENCOUNTER — TELEPHONE (OUTPATIENT)
Dept: FAMILY MEDICINE | Facility: OTHER | Age: 70
End: 2020-10-12

## 2020-10-12 NOTE — TELEPHONE ENCOUNTER
States blood pressures have been 128-130's over 75.  She will let us know if has any elevated at home.  Mary Hernandez LPN ...... 10/12/2020 1:18 PM

## 2020-10-12 NOTE — TELEPHONE ENCOUNTER
Patient's blood pressure was elevated at the last visit.  Has she been checking her blood pressure at home? What numbers is she getting?  Encouraged close recheck in clinic in the next 1-2 weeks if it is elevated at home.   Doris Hughes PA-C ..................10/12/2020 12:55 PM

## 2021-10-06 ENCOUNTER — HOSPITAL ENCOUNTER (OUTPATIENT)
Dept: MAMMOGRAPHY | Facility: OTHER | Age: 71
End: 2021-10-06
Attending: PHYSICIAN ASSISTANT
Payer: MEDICARE

## 2021-10-06 ENCOUNTER — OFFICE VISIT (OUTPATIENT)
Dept: FAMILY MEDICINE | Facility: OTHER | Age: 71
End: 2021-10-06
Attending: PHYSICIAN ASSISTANT
Payer: MEDICARE

## 2021-10-06 VITALS
DIASTOLIC BLOOD PRESSURE: 60 MMHG | TEMPERATURE: 97.4 F | RESPIRATION RATE: 18 BRPM | WEIGHT: 167.8 LBS | SYSTOLIC BLOOD PRESSURE: 152 MMHG | HEART RATE: 105 BPM | HEIGHT: 62 IN | OXYGEN SATURATION: 95 % | BODY MASS INDEX: 30.88 KG/M2

## 2021-10-06 DIAGNOSIS — I10 HYPERTENSION, UNSPECIFIED TYPE: ICD-10-CM

## 2021-10-06 DIAGNOSIS — Z13.820 SCREENING FOR OSTEOPOROSIS: ICD-10-CM

## 2021-10-06 DIAGNOSIS — Z23 NEEDS FLU SHOT: ICD-10-CM

## 2021-10-06 DIAGNOSIS — Z00.00 ENCOUNTER FOR MEDICARE ANNUAL WELLNESS EXAM: Primary | ICD-10-CM

## 2021-10-06 DIAGNOSIS — E78.5 HYPERLIPIDEMIA LDL GOAL <100: ICD-10-CM

## 2021-10-06 DIAGNOSIS — R73.03 PREDIABETES: ICD-10-CM

## 2021-10-06 DIAGNOSIS — R12 HEARTBURN: ICD-10-CM

## 2021-10-06 DIAGNOSIS — Z11.59 ENCOUNTER FOR HEPATITIS C SCREENING TEST FOR LOW RISK PATIENT: ICD-10-CM

## 2021-10-06 DIAGNOSIS — R73.09 ELEVATED GLUCOSE: ICD-10-CM

## 2021-10-06 DIAGNOSIS — Z78.0 ASYMPTOMATIC MENOPAUSAL STATE: ICD-10-CM

## 2021-10-06 DIAGNOSIS — Z12.31 VISIT FOR SCREENING MAMMOGRAM: ICD-10-CM

## 2021-10-06 LAB
ANION GAP SERPL CALCULATED.3IONS-SCNC: 9 MMOL/L (ref 3–14)
BUN SERPL-MCNC: 10 MG/DL (ref 7–25)
CALCIUM SERPL-MCNC: 9.9 MG/DL (ref 8.6–10.3)
CHLORIDE BLD-SCNC: 100 MMOL/L (ref 98–107)
CHOLEST SERPL-MCNC: 169 MG/DL
CO2 SERPL-SCNC: 29 MMOL/L (ref 21–31)
CREAT SERPL-MCNC: 0.84 MG/DL (ref 0.6–1.2)
FASTING STATUS PATIENT QL REPORTED: YES
GFR SERPL CREATININE-BSD FRML MDRD: 71 ML/MIN/1.73M2
GLUCOSE BLD-MCNC: 120 MG/DL (ref 70–105)
HBA1C MFR BLD: 6.3 % (ref 4–6.2)
HDLC SERPL-MCNC: 52 MG/DL (ref 23–92)
LDLC SERPL CALC-MCNC: 97 MG/DL
NONHDLC SERPL-MCNC: 117 MG/DL
POTASSIUM BLD-SCNC: 4 MMOL/L (ref 3.5–5.1)
SODIUM SERPL-SCNC: 138 MMOL/L (ref 134–144)
TRIGL SERPL-MCNC: 100 MG/DL

## 2021-10-06 PROCEDURE — 77063 BREAST TOMOSYNTHESIS BI: CPT

## 2021-10-06 PROCEDURE — 83036 HEMOGLOBIN GLYCOSYLATED A1C: CPT | Mod: ZL | Performed by: PHYSICIAN ASSISTANT

## 2021-10-06 PROCEDURE — 80048 BASIC METABOLIC PNL TOTAL CA: CPT | Mod: ZL | Performed by: PHYSICIAN ASSISTANT

## 2021-10-06 PROCEDURE — 86803 HEPATITIS C AB TEST: CPT | Mod: ZL | Performed by: PHYSICIAN ASSISTANT

## 2021-10-06 PROCEDURE — 80061 LIPID PANEL: CPT | Mod: ZL | Performed by: PHYSICIAN ASSISTANT

## 2021-10-06 PROCEDURE — 36415 COLL VENOUS BLD VENIPUNCTURE: CPT | Mod: ZL | Performed by: PHYSICIAN ASSISTANT

## 2021-10-06 PROCEDURE — G0439 PPPS, SUBSEQ VISIT: HCPCS | Performed by: PHYSICIAN ASSISTANT

## 2021-10-06 PROCEDURE — 90662 IIV NO PRSV INCREASED AG IM: CPT

## 2021-10-06 PROCEDURE — G0008 ADMIN INFLUENZA VIRUS VAC: HCPCS

## 2021-10-06 RX ORDER — FAMOTIDINE 20 MG/1
20 TABLET, FILM COATED ORAL
Qty: 180 TABLET | Refills: 3 | Status: SHIPPED | OUTPATIENT
Start: 2021-10-06 | End: 2022-10-10

## 2021-10-06 RX ORDER — SIMVASTATIN 20 MG
20 TABLET ORAL DAILY
Qty: 90 TABLET | Refills: 3 | Status: SHIPPED | OUTPATIENT
Start: 2021-10-06 | End: 2022-10-10

## 2021-10-06 RX ORDER — LISINOPRIL 20 MG/1
20 TABLET ORAL DAILY
Qty: 90 TABLET | Refills: 3 | Status: SHIPPED | OUTPATIENT
Start: 2021-10-06 | End: 2022-10-10

## 2021-10-06 ASSESSMENT — PAIN SCALES - GENERAL: PAINLEVEL: NO PAIN (0)

## 2021-10-06 ASSESSMENT — MIFFLIN-ST. JEOR: SCORE: 1235.42

## 2021-10-06 NOTE — LETTER
October 7, 2021      Irene Saravia  [address] [facility] [address]        Dear ,    We are writing to inform you of your test results.    Your hepatitis C testing is negative.    Your cholesterol, electrolytes, and kidney function are stable.    Your blood sugar was found to be mildly elevated at 120.  Your hemoglobin A1c which is a diabetic check which is an average of your blood sugars over the last 3 months was found to be in the prediabetic range at 6.3.  I would highly recommend working on good diet, exercise, losing weight, and decreasing sugar and carbohydrates in your diet to reduce your future risk of diabetes.  Please let me know if you would like a nutrition referral to further discuss your diet and ways to reduce diabetes concerns for the future.  I would recommend having this test rechecked in 6 to 12 months for monitoring.    Consider the following general recommendations to keep your blood sugar in a good range:    1. Lose weight - Losing 5 to 10 percent of your body weight can lower your risk a lot. If you weigh 200 pounds, that means you should lose 10 to 20 pounds. If you weigh 150 pounds, that means you should lose 7 to 15 pounds.     2. Eat right - Choose a diet rich in fruits, vegetables, and low-fat dairy products, but low in meats, sweets, and refined grains. Stay away from sweet drinks, like soda and juice.     3. Be active for 30 minutes a day - You don t have to go to the gym or break a sweat to get a benefit. Walking, gardening, and dancing are all activities that can help.     4. Quit smoking - If you smoke, ask your doctor or nurse for advice on how to quit. People are much more likely to succeed if they have help and get medicines to help them quit.         Resulted Orders   Hepatitis C Screen Reflex to HCV RNA Quant and Genotype   Result Value Ref Range    Hepatitis C Antibody Nonreactive Nonreactive    Narrative    Assay performance characteristics have not  been established for newborns, infants, and children.   Hemoglobin A1c   Result Value Ref Range    Hemoglobin A1C 6.3 (H) 4.0 - 6.2 %   Basic Metabolic Panel   Result Value Ref Range    Sodium 138 134 - 144 mmol/L    Potassium 4.0 3.5 - 5.1 mmol/L    Chloride 100 98 - 107 mmol/L    Carbon Dioxide (CO2) 29 21 - 31 mmol/L    Anion Gap 9 3 - 14 mmol/L    Urea Nitrogen 10 7 - 25 mg/dL    Creatinine 0.84 0.60 - 1.20 mg/dL    Calcium 9.9 8.6 - 10.3 mg/dL    Glucose 120 (H) 70 - 105 mg/dL    GFR Estimate 71 >60 mL/min/1.73m2      Comment:      As of July 11, 2021, eGFR is calculated by the CKD-EPI creatinine equation, without race adjustment. eGFR can be influenced by muscle mass, exercise, and diet. The reported eGFR is an estimation only and is only applicable if the renal function is stable.   Lipid Panel   Result Value Ref Range    Cholesterol 169 <200 mg/dL    Triglycerides 100 <150 mg/dL    Direct Measure HDL 52 23 - 92 mg/dL    LDL Cholesterol Calculated 97 <=100 mg/dL    Non HDL Cholesterol 117 <130 mg/dL    Patient Fasting > 8hrs? Yes     Narrative    Cholesterol  Desirable:  <200 mg/dL    Triglycerides  Normal:  Less than 150 mg/dL  Borderline High:  150-199 mg/dL  High:  200-499 mg/dL  Very High:  Greater than or equal to 500 mg/dL    Direct Measure HDL  Female:  Greater than or equal to 50 mg/dL   Male:  Greater than or equal to 40 mg/dL    LDL Cholesterol  Desirable:  <100mg/dL  Above Desirable:  100-129 mg/dL   Borderline High:  130-159 mg/dL   High:  160-189 mg/dL   Very High:  >= 190 mg/dL    Non HDL Cholesterol  Desirable:  130 mg/dL  Above Desirable:  130-159 mg/dL  Borderline High:  160-189 mg/dL  High:  190-219 mg/dL  Very High:  Greater than or equal to 220 mg/dL       If you have any questions or concerns, please call the clinic at the number listed above.       Sincerely,      Doris Hughes PA-C

## 2021-10-06 NOTE — PROGRESS NOTES
"SUBJECTIVE:   Irene Saravia is a 71 year old female who presents for Preventive Visit.      Patient has been advised of split billing requirements and indicates understanding: Yes  Are you in the first 12 months of your Medicare Part B coverage?  No    Physical Health:    In general, how would you rate your overall physical health? good    Outside of work, how many days during the week do you exercise? 6-7 days/week    Outside of work, approximately how many minutes a day do you exercise?15-30 minutes    If you drink alcohol do you typically have >3 drinks per day or >7 drinks per week? No    Do you usually eat at least 4 servings of fruit and vegetables a day, include whole grains & fiber and avoid regularly eating high fat or \"junk\" foods? Yes    Do you have any problems taking medications regularly?  No    Do you have any side effects from medications? none    Needs assistance for the following daily activities: no assistance needed    Which of the following safety concerns are present in your home?  none identified     Hearing impairment: No    In the past 6 months, have you been bothered by leaking of urine? no    Mental Health:    In general, how would you rate your overall mental or emotional health? excellent  PHQ-2 Score: 0    Do you feel safe in your environment? Yes    Have you ever done Advance Care Planning? (For example, a Health Directive, POLST, or a discussion with a medical provider or your loved ones about your wishes): No, advance care planning information given to patient to review.  Patient plans to discuss their wishes with loved ones or provider.      Additional concerns to address?  No    Fall risk:  Fallen 2 or more times in the past year?: No  Any fall with injury in the past year?: No    Cognitive Screenin) Repeat 3 items (Leader, Season, Table)    2) Clock draw: NORMAL  3) 3 item recall: Recalls 3 objects  Results: 3 items recalled: COGNITIVE IMPAIRMENT LESS LIKELY    Mini-CogTM " Copyright ERICH [employer]. Licensed by the author for use in [facility]; reprinted with permission ([email or web address]). All rights reserved.      Do you have sleep apnea, excessive snoring or daytime drowsiness?: no    -139/68-72    Patient's blood pressure is elevated in clinic today.  She states that when she typically checks her blood pressure at home she gets a systolic 126-139/diastolic 68-72.  No chest pain, palpitations, problems breathing.  No side effects noted with the blood pressure medications.  No recent cough or cold symptoms.    Patient tolerates her simvastatin well for treating her hyperlipidemia.  No side effects noted.    Patient takes famotidine for heartburn.  No side effects noted with the medication.  Tolerates the medication well.    Patient needs a flu shot.    Reviewed and updated as needed this visit by clinical staff  Tobacco  Allergies  Meds  Problems  Med Hx  Surg Hx  Fam Hx  Soc Hx          Reviewed and updated as needed this visit by Provider  Tobacco  Allergies  Meds  Problems  Med Hx  Surg Hx  Fam Hx         Social History     Tobacco Use     Smoking status: Former Smoker     Packs/day: 0.25     Years: 40.00     Pack years: 10.00     Types: Cigarettes     Quit date: 2015     Years since quittin.1     Smokeless tobacco: Never Used     Tobacco comment: Quit smoking: It's been over one year since quitting   Substance Use Topics     Alcohol use: No                           Current providers sharing in care for this patient include:   Patient Care Team:  Doris Hughes PA-C as PCP - General (Family Medicine)  Doris Hughes PA-C as Assigned PCP    The following health maintenance items are reviewed in Epic and correct as of today:  Health Maintenance   Topic Date Due     DEXA  Never done     ZOSTER IMMUNIZATION (1 of 2) Never done     MEDICARE ANNUAL WELLNESS VISIT  10/06/2022     FALL RISK ASSESSMENT  10/06/2022     MAMMO SCREENING  10/06/2023      DTAP/TDAP/TD IMMUNIZATION (3 - Td or Tdap) 2024     LIPID  10/06/2026     ADVANCE CARE PLANNING  10/06/2026     COLORECTAL CANCER SCREENING  10/31/2026     HEPATITIS C SCREENING  Completed     PHQ-2  Completed     INFLUENZA VACCINE  Completed     Pneumococcal Vaccine: 65+ Years  Completed     COVID-19 Vaccine  Completed     IPV IMMUNIZATION  Aged Out     MENINGITIS IMMUNIZATION  Aged Out     HEPATITIS B IMMUNIZATION  Aged Out     Labs reviewed in EPIC  BP Readings from Last 3 Encounters:   10/06/21 (!) 152/60   10/08/20 (!) 190/84   10/16/19 (!) 152/66    Wt Readings from Last 3 Encounters:   10/06/21 76.1 kg (167 lb 12.8 oz)   10/08/20 78.5 kg (173 lb)   10/16/19 75.6 kg (166 lb 9.6 oz)                  Patient Active Problem List   Diagnosis     Breast cancer screening     DNR (do not resuscitate)     Family history of hypertension     Health care maintenance     Displacement of intervertebral disc     History of loop electrical excision procedure (LEEP)     History of tobacco use     Papanicolaou smear of cervix with atypical squamous cells of undetermined significance (ASC-US)     Primary vulvar squamous cell carcinoma (H)     Seborrheic keratoses, inflamed     Tobacco abuse     Prediabetes     Hyperlipidemia LDL goal <100     Hypertension, unspecified type     Heartburn     Past Surgical History:   Procedure Laterality Date     COLONOSCOPY  10/31/2016    10/31/2016,F/U  normal     OTHER SURGICAL HISTORY      ,[id number],COLPOSCOPY,LEEP, [place]     OTHER SURGICAL HISTORY      ,NECK/THORAX PROCEDURE,Reduction of herniated disc     OTHER SURGICAL HISTORY      2014,2083,RADIATION TREATMENT,vulvar cancer, chemotherapy       Social History     Tobacco Use     Smoking status: Former Smoker     Packs/day: 0.25     Years: 40.00     Pack years: 10.00     Types: Cigarettes     Quit date: 2015     Years since quittin.1     Smokeless tobacco: Never Used     Tobacco comment: Quit smoking:  It's been over one year since quitting   Substance Use Topics     Alcohol use: No     Family History   Problem Relation Age of Onset     Diabetes Father         Diabetes     Other - See Comments Father         Psychiatric illness,Alzheimer's     Breast Cancer Sister 40        Cancer-breast     Family History Negative Mother         Good Health     Hypertension Mother         Hypertension     Family History Negative Sister         Good Health     Diabetes Sister         Diabetes     Family History Negative Brother         Good Health,x3     Diabetes Brother         Diabetes     Diabetes Other         Diabetes,Positive for diabetes mellitus type 2     Colon Cancer Paternal Uncle 80        Cancer-colon     Ovarian Cancer No family hx of         Cancer-ovarian     Prostate Cancer No family hx of         Cancer-prostate     Heart Disease No family hx of         Heart Disease     Thyroid Disease No family hx of         Thyroid Disease     Blood Disease No family hx of         Blood Disease         Current Outpatient Medications   Medication Sig Dispense Refill     famotidine (PEPCID) 20 MG tablet Take 1 tablet (20 mg) by mouth 2 times daily (before meals) 180 tablet 3     lisinopril (ZESTRIL) 20 MG tablet Take 1 tablet (20 mg) by mouth daily 90 tablet 3     simvastatin (ZOCOR) 20 MG tablet Take 1 tablet (20 mg) by mouth daily 90 tablet 3     Allergies   Allergen Reactions     Ascorbate Other (See Comments)     Cold sores     Recent Labs   Lab Test 10/06/21  1152 10/08/20  1116 10/16/19  1217 10/16/19  1217 10/02/18  1254 10/02/18  1254   A1C 6.3*  --   --   --   --   --    LDL 97  --   --  138*  --  153*   HDL 52  --   --  62  --  57   TRIG 100  --   --  97  --  108   ALT  --   --   --  12  --   --    CR 0.84 0.86   < > 0.87   < > 0.70   GFRESTIMATED 71 66   < > 65   < > 84   GFRESTBLACK  --  79  --  79   < > >90   POTASSIUM 4.0 3.8   < > 4.7   < > 4.2    < > = values in this interval not displayed.     "      ROS:  Constitutional, HEENT, cardiovascular, pulmonary, GI, , musculoskeletal, neuro, skin, endocrine and psych systems are negative, except as otherwise noted.    OBJECTIVE:   BP (!) 152/60   Pulse 105   Temp 97.4  F (36.3  C) (Tympanic)   Resp 18   Ht 1.568 m (5' 1.75\")   Wt 76.1 kg (167 lb 12.8 oz)   SpO2 95%   Breastfeeding No   BMI 30.94 kg/m   Estimated body mass index is 30.94 kg/m  as calculated from the following:    Height as of this encounter: 1.568 m (5' 1.75\").    Weight as of this encounter: 76.1 kg (167 lb 12.8 oz).  EXAM:   GENERAL APPEARANCE: healthy, alert and no distress  EYES: Eyes grossly normal to inspection, PERRL and conjunctivae and sclerae normal  HENT: ear canals and TM's normal, nose and mouth without ulcers or lesions, oropharynx clear and oral mucous membranes moist  NECK: no adenopathy, no asymmetry, masses, or scars and thyroid normal to palpation  RESP: lungs clear to auscultation - no rales, rhonchi or wheezes  BREAST: normal without masses, tenderness or nipple discharge and no palpable axillary masses or adenopathy  CV: regular rate and rhythm, normal S1 S2, no S3 or S4, no murmur, click or rub, no peripheral edema and peripheral pulses strong  ABDOMEN: soft, nontender, no hepatosplenomegaly, no masses and bowel sounds normal  MS: no musculoskeletal defects are noted and gait is age appropriate without ataxia  SKIN: no suspicious lesions or rashes  NEURO: Normal strength and tone, sensory exam grossly normal, mentation intact and speech normal  PSYCH: mentation appears normal and affect normal/bright    Diagnostic Test Results:  Labs reviewed in Epic    ASSESSMENT / PLAN:       ICD-10-CM    1. Encounter for Medicare annual wellness exam  Z00.00    2. Needs flu shot  Z23 INFLUENZA, QUAD, HIGH DOSE, PF, 65YR + (FLUZONE HD)   3. Heartburn  R12 famotidine (PEPCID) 20 MG tablet   4. Hypertension, unspecified type  I10 lisinopril (ZESTRIL) 20 MG tablet     simvastatin " (ZOCOR) 20 MG tablet     Basic Metabolic Panel     Basic Metabolic Panel   5. Hyperlipidemia LDL goal <100  E78.5 simvastatin (ZOCOR) 20 MG tablet     Lipid Panel     Lipid Panel   6. Elevated glucose  R73.09 Hemoglobin A1c     Hemoglobin A1c   7. Encounter for hepatitis C screening test for low risk patient  Z11.59 Hepatitis C Screen Reflex to HCV RNA Quant and Genotype     Hepatitis C Screen Reflex to HCV RNA Quant and Genotype   8. Screening for osteoporosis  Z13.820 DX Hip/Pelvis/Spine   9. Asymptomatic menopausal state   Z78.0 DX Hip/Pelvis/Spine   10. Prediabetes  R73.03      Last mammogram was 10/8/2020.  Patient has a repeat mammogram today 10/6/2021.  DEXA last completed - none, would like one ordered.    Pap deferred due to age >65.    Colon cancer screening last completed 10/31/2016 normal, repeat 2026.     Abdominal aortic aneurysm screening last completed 10/10/2017.    Hepatitis C screening - none, low risk patient, would like it ordered for screening.    Vaccines reviewed - yes, needs a flu shot.    Hyperlipidemia: Refill medications.  No acute concerns at this time.  Complete lipid panel for monitoring.  Lab is stable.    Hypertension:   Completed blood work, BMP for monitoring.  Blood sugar was mildly elevated at 120.  Lab otherwise unremarkable.  Completed hemoglobin A1c for diabetes mellitus screening.  Hemoglobin A1c is elevated at 6.3 which is in the prediabetic range.  Encourage good diet, exercise, decreasing fat and sugar in her diet, decreasing carbohydrates, and losing weight.  Encouraged having her blood sugars rechecked in 6 to 12 months for monitoring.  Blood pressure is elevated today. Increased lisinopril to 20 mg daily.  Gave side effect profile.  Encouraged to monitor blood pressure a couple times a week over the next few weeks. Return in 3-4 weeks for a recheck appointment. Work on diet and exercise to decrease blood pressure. Weight loss is helpful.  Decrease salt intake.      --  "Learn about DASH Diet (http://bit.ly/DASHDiet - redirects to the [facility] [place]) for dietary ways to reduce blood pressure    Patient was given a flu shot.    Ordered DEXA scan for bone density screening.  Ordered hepatitis C screening for low risk hepatitis C screening.    Refilled famotidine with history of heartburn.  No acute concerns at this time.    Refilled simvastatin with history of hyperlipidemia.  No acute concerns at this time.    Patient has been advised of split billing requirements and indicates understanding: Yes    COUNSELING:  Reviewed preventive health counseling, as reflected in patient instructions       Regular exercise       Healthy diet/nutrition       Vision screening       Hearing screening       Dental care       Osteoporosis prevention/bone health       Colon cancer screening       Hepatitis C screening    Estimated body mass index is 30.94 kg/m  as calculated from the following:    Height as of this encounter: 1.568 m (5' 1.75\").    Weight as of this encounter: 76.1 kg (167 lb 12.8 oz).        She reports that she quit smoking about 6 years ago. Her smoking use included cigarettes. She has a 10.00 pack-year smoking history. She has never used smokeless tobacco.    Appropriate preventive services were discussed with this patient, including applicable screening as appropriate for cardiovascular disease, diabetes, osteopenia/osteoporosis, and glaucoma.  As appropriate for age/gender, discussed screening for colorectal cancer, prostate cancer, breast cancer, and cervical cancer. Checklist reviewing preventive services available has been given to the patient.    Reviewed patients plan of care and provided an AVS. The Basic Care Plan (routine screening as documented in Health Maintenance) for Irene meets the Care Plan requirement. This Care Plan has been established and reviewed with the Patient.    Counseling Resources:  ATP IV Guidelines  Pooled Cohorts Equation Calculator  Breast Cancer Risk " Calculator  BRCA-Related Cancer Risk Assessment: FHS-7 Tool  FRAX Risk Assessment  ICSI Preventive Guidelines  Dietary Guidelines for Americans, 2010  USDA's MyPlate  ASA Prophylaxis  Lung CA Screening    Doris Hughes PA-C  [facility]

## 2021-10-06 NOTE — PATIENT INSTRUCTIONS
"  Patient Education   Personalized Prevention Plan  You are due for the preventive services outlined below.  Your care team is available to assist you in scheduling these services.  If you have already completed any of these items, please share that information with your care team to update in your medical record.  Health Maintenance Due   Topic Date Due     Osteoporosis Screening  Never done     Hepatitis C Screening  Never done     Zoster (Shingles) Vaccine (1 of 2) Never done     Flu Vaccine (1) 09/01/2021     Annual Wellness Visit  10/08/2021     FALL RISK ASSESSMENT  10/08/2021           Healthy Strategies  1. Eat at least 3 meals a day and never skip breakfast.  2. Eat more slowly.  3. Decrease portion size.  4. Provide structure by using meal replacement bars or shakes, and/or low calorie frozen meals.  5. For good nutrition incorporate fruit, vegetables, whole grains, lean protein, and low-fat dairy.  6. Remove trigger foods from yourenvironment to avoid impulse eating.  7. Increase physical activity: get a pedometer and aim for 10,000 steps a day or 30-35 minutes of activity 5 days per week.  8. Weigh yourself daily or at least weekly.  9. Keep a record of what you eat and your activity.  10. Establish a support system such as afriend, group or program.    11. Read Mookie Serrano's \"Eat to Live\". Remember it is important to have a minimum of 1200 calories a day, okay to use olive oil, 40 grams of fiber daily. No more than two servings (the size of your palm) of red meat a week.     Please consider the following general health recommendations:    Eat a quality diet (generally, low in simple sugars, starches, cholesterol and saturated fat.)    Please get 1200 mg of calcium in divided doses with 800 units vitamin D in your diet daily. Take supplements as needed to obtain full recommended amounts.     Stay physically active. Regular walking or other exercise is one of the best ways to minimize pain of arthritis; " maintain independence and mobility; maintain bone strength; maintain conditioning of your heart. Find something you enjoy and a friend to do it with you.    Maintain ideal weight. Your Body mass index is Body mass index is 30.94 kg/m .. Generally a BMI of 20-25 is considered ideal. Overweight is defined as 25-30, obese is 30-35 and markedly obese is greater than 35.    Apply sun block (SPF 25 or greater) on exposed skin anytime you are out in the sun to prevent skin cancer.     Wear a seatbelt whenever you are in a car.    Obtain a flu shot every fall.    You should have a tetanus booster at least once every 10 years.    Schedule a mammogram annually starting at the age of 40 years old unless recommended earlier by your primary care provider. Come for a general exam once yearly.    Colonoscopy (an exam of the colon) is recommended every 10 years after the age of 50 to screen for colon cancer. (More often if you are at increased risk.)    A vaccine to reduce your chances of getting shingles is available if you are over 50. Information about the vaccine is available through the clinic or at:  (https://www.cdc.gov/vaccines/vpd/shingles/public/shingrix/index.html)    Consider a bone density study every 2-5 years to see if you are at increased risk for fracture. If you have osteoporosis, medicine to strengthen your bones can significantly reduce your risk of fracture, back pain, and loss of height.    Receive a pneumonia shot series after the age of 65 (repeat in 5 years if you have other risk factors). This does not prevent all types of pneumonia, but reduces the risk of the worst bacterial causes of pneumonia.    Check blood sugar annually. Cholesterol annually unless you have had a normal level when last checked within 5 years.     I recommend that you have a general physical exam every year.     Hypertension:   Blood pressure is elevated today. Increased lisinopril to 20 mg daily.   Encouraged to monitor blood  pressure a couple times a week over the next few weeks. Return in 3-4 weeks for a recheck appointment. Work on diet and exercise to decrease blood pressure. Weight loss is helpful.  Decrease salt intake.      -- Learn about DASH Diet (http://bit.ly/DASHDiet - redirects to the [facility]) for dietary ways to reduce blood pressure      Patient Education     Eating Heart-Healthy Food: Using the DASH Plan    Eating for your heart doesn t have to be hard or boring. You just need to know how to make healthier choices. The DASH eating plan has been developed to help you do just that. DASH stands for Dietary Approaches to Stop Hypertension. It is a plan that has been proven to be healthier for your heart and to lower your risk for high blood pressure. It can also help lower your risk for cancer, heart disease, osteoporosis, and diabetes.  Choosing from each food group  Choose foods from each of the food groups below each day. Try to get the recommended number of servings for each food group. The serving numbers are based on a diet of 2,000 calories a day. Talk with your healthcare provider if you re not sure about your calorie needs. Along with getting the correct servings, the DASH plan also advises less than 2,300 mg of salt (sodium) per day. Lowering sodium intake to 1,500 mg per day lowers blood pressure even more. (There's about 2,300 mg of sodium in 1 teaspoon of salt.)      Grains  Servings: 6 to 8 a day  A serving is:    1 slice bread    1 ounce dry cereal    Half a cup cooked rice, pasta or cereal  Best choices: Whole grains and any grains high in fiber. Vegetables  Servings: 4 to 5 a day  A serving is:    1 cup raw leafy vegetable    Half a cup cut-up raw or cooked vegetable    Half a cup vegetable juice  Best choices: Fresh or frozen vegetables prepared without added salt or fat.   Fruits  Servings: 4 to 5 a day  A serving is:    1 medium fruit    One-quarter cup dried fruit    Half a cup fresh, frozen, or canned  fruit    Half a cup of 100% fruit juices  Best choices: A variety of fresh fruits of different colors. Whole fruits are a better choice than fruit juices. Low-fat or fat-free dairy  Servings: 2 to 3 a day  A serving is:    1 cup milk    1 cup yogurt    One and a half ounces cheese  Best choices: Skim or 1% milk, low-fat or fat-free yogurt or buttermilk, and low-fat cheeses.         Lean meats, poultry, fish  Servings: 6 or fewer a day  A serving is:    1 ounce cooked meats, poultry, or fish    1 egg  Best choices: Lean poultry and fish. Trim away visible fat. Broil, grill, roast, or boil instead of frying. Remove skin from poultry before eating. Limit how much red meat you eat.  Nuts, seeds, beans  Servings: 4 to 5 a week  A serving is:    One-third cup nuts (one and a half ounces)    2 tablespoons nut butter or seeds    Half a cup cooked dry beans or legumes  Best choices: Dry roasted nuts with no salt added, lentils, kidney beans, garbanzo beans, and whole keene beans.   Fats and oils  Servings: 2 to 3 a day  A serving is:    1 teaspoon vegetable oil    1 teaspoon soft margarine    1 tablespoon mayonnaise    2 tablespoons salad dressing  Best choices: Nut and vegetable oils (nontropical vegetable oils), such as olive and canola oil. Sweets  Servings: 5 a week or fewer  A serving is:    1 tablespoon sugar, maple syrup, or honey    1 tablespoon jam or jelly    1 half-ounce jelly beans (about 15)    1 cup lemonade  Best choices: Dried fruit can be a satisfying sweet. Choose low-fat sweets. And watch your serving sizes!      For more on the DASH eating plan, visit:  www.nhlbi.nih.gov/health/health-topics/topics/dash   Caryn last reviewed this educational content on 7/1/2019 2000-2021 [employer]. All rights reserved. This information is not intended as a substitute for professional medical care. Always follow your healthcare professional's instructions.           Patient Education     Lowering Your  Blood Pressure with DASH  What is the DASH eating plan?  DASH (Dietary Approaches to Stop Hypertension) can help you prevent or lower high blood pressure. This eating plan provides the nutrients that help lower blood pressure: potassium, magnesium, calcium, protein and fiber.   If not controlled, high blood pressure may lead to heart disease, stroke or blindness.  This eating plan is rich in:     Fruits and vegetables    Whole grains    Fat-free or low-fat milk products    Fish and poultry (chicken, turkey, etc.)    Beans, seeds and nuts.  This eating plan is low in:     Salt and sodium    Sugar, sweets and drinks with sugar    Red meat, saturated fat and trans fat.  Lifestyle changes  Besides a healthy eating plan, other steps are needed to help control high blood pressure.     Stay at a healthy weight.    Be active for at least 30 minutes on most days.    If you drink alcohol, have no more than two drinks per day (for men) or one per day (for women).    If you take blood pressure medicine, take it as directed.  Losing weight with DASH  You can lose weight by eating fewer calories. It is best to take off pounds slowly over time. Talk to a dietitian about the best way to do this.  Staying active   To shed pounds, combine DASH with daily exercise. Start with a 15-minute walk each day. Slowly increase the time until you reach a total of 30 minutes on most days. To avoid weight gain, try for 60 minutes each day. Check with your doctor before starting any exercise program.  Tips for less sodium    Avoid processed foods. These may include baked goods, cereals, soy sauce and even antacids.    Cook with less salt. Don't bring the saltshaker to the table.    Season food with herbs, spices, lemon, lime, vinegar, wine and salt-free seasoning blends.    Use low-sodium canned vegetables or fruits.  Tips to ease the change  Take a week or two to slowly make changes to your diet.    Add a serving of vegetables at lunch one day.  The next day, add a serving at dinner as well.    Add fruit to one meal or eat it as a snack.    Work up to three servings of fat-free and low-fat milk products each day.    If you eat large portions of meat, cut back by a third at each meal. The goal is to eat 6 oz (ounces) of meat or less per day.    Serve brown rice and whole-wheat bread and pasta.    Try casseroles and stir-holland dishes that have less meat and more vegetables, grains or cooked dry beans.    Serve two or more meatless meals each week.    For snacks and desserts, eat foods low in fat, sodium and sugar, such as:  ? Unsalted rice cakes, nuts or seeds  ? Fresh fruits, raw vegetables and raisins  ? Fat-free, low-fat or frozen yogurt  ? Popcorn with no salt or butter.    If you have trouble digesting milk products, try lactose-free milk or take lactase pills.    If you have a nut allergy, eat seeds, beans, lentils or split peas.    Fruits, vegetables and whole grain foods are high in fiber. To avoid bloating and diarrhea (loose, watery stools), increase these foods over several weeks.  The DASH eating plan  Use this chart to plan your meals. Or take it with you when you shop for food.   Food Group Servings per Day  Serving Size Examples    1,600 Calories 2,000 Calories 2,600 Calories      Grains  (whole wheat) 6 6 to 8 10 to 11   1 slice bread    1 oz dry cereal      cup cooked rice, pasta or cereal Whole-wheat bread and rolls, whole-wheat pasta, English muffin, liang bread, bagel, cereals, grits, oatmeal, brown rice, unsalted pretzels and popcorn   Vegetables  3 to 4 4 to 5 5 to 6   1 cup raw leafy vegetables      cup cut-up raw or cooked vegetable      cup vegetable juice Broccoli, carrots, collards, green beans, green peas, kale, lima beans, potatoes, spinach, squash, sweet potatoes, tomatoes   Fruits 4 4 to 5 5 to 6   1 medium fruit      cup dried fruit      cup fresh, frozen, or canned fruit      cup fruit juice Apples, apricots, bananas, dates,  grapes, oranges, grapefruit, mangoes, melons, peaches, pineapples, raisins, strawberries, tangerines   Fat-free or   low-fat milk and milk products 2 to 3 2 to 3 3   1 cup milk or yogurt    1   oz cheese Fat-free or low-fat (1%) milk, buttermilk, cheese, regular or frozen yogurt   Lean meats, poultry and fish 3 to 6 6 or less 6   1 oz cooked meats, poultry (chicken, turkey) or fish    1 egg    2 egg whites Lean meats (trim away any fat, then broil, roast or poach); remove skin from poultry. Eggs are high in cholesterol, so limit egg yolks to four or less per week.   Nuts, seeds   and legumes 3 per week 4 to 5 per week 1 per day   ? cup (1   oz) nuts    2 Tbsp peanut butter    2 Tbsp (   oz) seeds      cup cooked legumes (dry beans and peas) Almonds, hazelnuts, mixed nuts, peanuts, walnuts, sunflower seeds, peanut butter, kidney beans, lentils, split peas   Fats and oils 2 2 to 3 3   1 tsp soft margarine    1 tsp vegetable oil    1 Tbsp cortez    2 Tbsp salad dressing Soft margarine, vegetable oil (such as canola, corn, olive or safflower), low-fat mayonnaise, light salad dressing   Sweets and   added sugars 0 5 or less per week 2 or less per day   1 Tbsp sugar, jelly or jam      cup sorbet or gelatin    1 cup lemonade Fruit-flavored gelatin, fruit punch, hard candy, jelly, maple syrup, sorbets and ices   A sample meal plan  This sample menu gives two sodium levels. Start with 2,300 mg of sodium (about 1 teaspoon of table salt per day). Then, try to lower your intake to 1,500 mg a day. Talk to your doctor or dietitian about how to do this.   These samples are for people who eat 2,000 calories per day. The less salt you eat, the more you may lower your blood pressure.   Menu for 2,300 mg sodium per day   Breakfast:   cup instant oatmeal, 1 mini whole-wheat bagel, 1 tablespoon peanut butter, 1 medium banana, 1 cup (8 ounces) low-fat milk.   Lunch: 1 cup cantaloupe chunks, 1 cup apple juice and one chicken breast sandwich  "that includes:    Two slices (3 ounces) chicken breast, no skin    Two slices whole-wheat bread    1 slice (   ounce) reduced-fat cheddar cheese    One leaf sergei lettuce    Two slices tomato    1 tablespoon low-fat mayonnaise.  Dinner: 1 cup cooked spaghetti,   cup low-salt vegetarian spaghetti sauce, 3 tablespoons Parmesan cheese;   cup corn (from frozen);   cup canned pears in juice; one spinach salad that includes:    1 cup fresh spinach leaves      cup fresh carrots, grated      cup fresh mushrooms, sliced    1 tablespoon vinegar and oil dressing.  Snacks:? cup unsalted almonds;   cup dried apricots; 1 cup fat-free fruit yogurt, no sugar added.  Reducing to 1,500 mg sodium per day  Use the same menu plan, but:    For breakfast, replace instant oatmeal with regular oatmeal and 1 teaspoon cinnamon.    For lunch, replace cheddar cheese with low-sodium Swiss cheese.  Resources on diet and health  National Heart, [employer] and [facility] [place]  [facility] Information Center  [address] [address]   [place] [address]   Phone: [phone]   TTY: [phone]   www.nhlbi.nih.gov   \"Aim for a Healthy Weight\"   www.nhlbi.nih.gov/health/public/heart/obesity/lose_wt/index.htm  \"Dietary Guidelines for Americans 2005\"   and \"A Healthier You\"   www.healthierus.gov/dietaryguidelines  For informational purposes only. Not to replace the advice of your health care provider. Adapted from \"Your Guide to Lowering Blood Pressure with DASH,\" by the National Heart, Lung and [employer] [place],   NIH Publication No. , revised April 2006. Available at www.nhlbi.nih.gov/health/public/heart/hbp/dash/index.htm. Published by [employer]. [employer] 880483 - REV 09/15.  For informational purposes only. Not to replace the advice of your health care provider.  Copyright   2018 [employer]. All rights reserved.           Patient Education     Managing High Blood Pressure with the DASH Diet  What you eat can help lower " your blood pressure and reduce your risk for stroke and heart disease.  One such diet, the Dietary Approaches to Stop Hypertension (DASH) diet, has been shown to reduce blood pressure. This diet is low in saturated fat, cholesterol, and total fat. The diet emphasizes fruits, vegetables, and low-fat dairy products.  Your blood pressure can be unhealthy even if it stays only slightly above 120/80 mm Hg. The higher above that level, the greater your health risk. Over time, high blood pressure makes your heart work too hard. This can cause stroke, heart disease, heart failure, kidney disease, and even blindness.  Blood pressure measurements are given as 2 numbers. Systolic blood pressure is the upper number. This is the pressure when the heart contracts. Diastolic blood pressure is the lower number. This is the pressure when the heart relaxes between beats. Blood pressure is categorized as normal, elevated, or stage 1 or stage 2 high blood pressure:    Normal blood pressure is systolic of less than 120 and diastolic of less than 80 (120/80)    Elevated blood pressure is systolic of 120 to 129 and diastolic less than 80    Stage 1 high blood pressure is systolic is 130 to 139 or diastolic between 80 to 89    Stage 2 high blood pressure is when systolic is 140 or higher or the diastolic is 90 or higher  High blood pressure is diagnosed when multiple, separate readings show blood pressure above 130/80 mmHg. Talk with your healthcare provider if you have questions or concerns about your blood pressure readings.  Why this diet works  Why is the DASH diet so effective at reducing blood pressure? It combines many nutrients that have been shown to help reduce blood pressure. Those nutrients include calcium, potassium, magnesium, protein, and fiber, as well as lower total fat and saturated fat.  The DASH diet is naturally low in salt. The DASH diet recommends menus containing 2,300 mg of sodium. The lower sodium DASH diet  contains up to 1,500 mg of sodium a day. (One teaspoon of salt contains 2,300 mg of sodium.)   Further, following the DASH diet may delay your need to take blood pressure lowering medicine, and may even keep you from needing to take it at all. And if you're already on medicine, it may help you reduce the amount you take.  Doing the DASH  The DASH diet is a 2,000-calorie diet that includes:    Six to 8 daily servings of grains and grain products, such as whole-wheat bread, cereal, oatmeal, crackers, unsalted pretzels, and popcorn. A serving size is 1 slice of bread, 1 cup of ready-to-eat cereal, or a half-cup of rice, pasta, or cereal.    Four to 5 daily servings of vegetables, the darker in color, the better. A serving size is 1 cup of raw leafy vegetables, a half-cup of cooked vegetables, or 6 ounces of vegetable juice.    Four to 5 daily servings of fruit. A serving is 1 medium fruit, quarter-cup of dried fruit, half-cup of fresh, frozen or canned fruit, or 6 ounces of fruit juice.    Two or 3 daily servings of low-fat or fat-free dairy products. A serving is 8 ounces of milk, 1 cup of yogurt, or 1  ounces of cheese.    Six or fewer daily servings of lean meat, poultry, or fish. A serving is 1 ounce of cooked meats, skinless poultry, or fish.    Four to 5 servings per week of nuts, seeds, and dry beans. A serving is one-third cup or 1  ounces of nuts, 1 tablespoon or half-ounce of seeds, or half-cup cooked dried beans.    Two to 3 small daily servings of fats and oils like olive oil and low-fat salad dressing. A serving is 1 teaspoon soft margarine, 1 tablespoon low-fat mayonnaise, 2 tablespoons light salad dressing, or 1 teaspoon vegetable oil. Don't have fats that are saturated (animal fats) or trans fats.    Five or fewer servings per week of sweets like maple syrup, sorbet, or gelatin. A serving is 1 tablespoon sugar, 1 tablespoon jelly or jam, half-ounce jellybeans, or 8 ounces of lemonade.  Although the DASH  "diet isn't designed for weight loss, it can promote it if you reduce the number of servings you eat. Most of the food the diet features is big on volume and low in calories.  Still, there are parts of the DASH diet that may not be easy to follow. For one, it's packed with dark-colored fruits and vegetables, so be prepared to be choosier at the supermarket. Also, if it's very different from what you normally eat, it may be hard to adjust.  If you're serious about following the diet, it's a good idea to work with a registered dietitian (RD) for support and guidance. (For the names of RDs in your area who know about the DASH diet, visit the Academy of Nutrition and Dietetics.)  Moving forward  If you decide to go it alone, adopt the DASH diet gradually. By doing so, you'll be more likely to stick to it long-term. For example, add 1 more serving of vegetables at lunch and dinner if you eat only 1 or 2 servings a day now. You might also add fruit to meals and snacks if you now only have juice for breakfast. In addition, slowly increase your dairy products to 3 servings per day. Try drinking skim milk with lunch or dinner, instead of soda, alcohol, or tea.  To get the most out of the DASH, lose weight if you need to and exercise regularly. Thirty to 40 minutes of moderate to vigorous intensity aerobic exercise 4 to 5 days a week is recommended.   More dish on the DASH  \"The DASH Eating Plan\" is a 24-page online guide published by the [employer]. It offers a reader-friendly explanation of high blood pressure, detailed daily servings charts to help you plan your menus, a week of suggested DASH menus, plus tips to reduce sodium.  For more information about diet and other lifestyle factors to reduce hypertension, visit Your Guide to Lowering Blood Pressure.    4141-1261 [employer]. All rights reserved. This information is not intended as a substitute for professional medical care. Always follow your healthcare " professional's instructions.

## 2021-10-07 LAB — HCV AB SERPL QL IA: NONREACTIVE

## 2021-10-08 PROBLEM — E78.5 HYPERLIPIDEMIA LDL GOAL <100: Status: ACTIVE | Noted: 2021-10-08

## 2021-10-08 PROBLEM — R12 HEARTBURN: Status: ACTIVE | Noted: 2021-10-08

## 2021-10-08 PROBLEM — I10 HYPERTENSION, UNSPECIFIED TYPE: Status: ACTIVE | Noted: 2021-10-08

## 2021-10-08 PROBLEM — R73.03 PREDIABETES: Status: ACTIVE | Noted: 2021-10-08

## 2021-10-28 ENCOUNTER — HOSPITAL ENCOUNTER (OUTPATIENT)
Dept: BONE DENSITY | Facility: OTHER | Age: 71
End: 2021-10-28
Attending: PHYSICIAN ASSISTANT
Payer: MEDICARE

## 2021-10-28 ENCOUNTER — OFFICE VISIT (OUTPATIENT)
Dept: FAMILY MEDICINE | Facility: OTHER | Age: 71
End: 2021-10-28
Attending: PHYSICIAN ASSISTANT
Payer: MEDICARE

## 2021-10-28 VITALS
RESPIRATION RATE: 16 BRPM | DIASTOLIC BLOOD PRESSURE: 70 MMHG | TEMPERATURE: 97.1 F | WEIGHT: 168.2 LBS | HEART RATE: 122 BPM | OXYGEN SATURATION: 97 % | SYSTOLIC BLOOD PRESSURE: 124 MMHG | BODY MASS INDEX: 31.01 KG/M2

## 2021-10-28 DIAGNOSIS — Z78.0 ASYMPTOMATIC MENOPAUSAL STATE: ICD-10-CM

## 2021-10-28 DIAGNOSIS — Z13.820 SCREENING FOR OSTEOPOROSIS: ICD-10-CM

## 2021-10-28 DIAGNOSIS — I10 HYPERTENSION, UNSPECIFIED TYPE: Primary | ICD-10-CM

## 2021-10-28 PROBLEM — Z66 DNR (DO NOT RESUSCITATE): Status: RESOLVED | Noted: 2017-10-02 | Resolved: 2021-10-28

## 2021-10-28 LAB
ANION GAP SERPL CALCULATED.3IONS-SCNC: 8 MMOL/L (ref 3–14)
BUN SERPL-MCNC: 11 MG/DL (ref 7–25)
CALCIUM SERPL-MCNC: 9.9 MG/DL (ref 8.6–10.3)
CHLORIDE BLD-SCNC: 100 MMOL/L (ref 98–107)
CO2 SERPL-SCNC: 30 MMOL/L (ref 21–31)
CREAT SERPL-MCNC: 0.86 MG/DL (ref 0.6–1.2)
GFR SERPL CREATININE-BSD FRML MDRD: 69 ML/MIN/1.73M2
GLUCOSE BLD-MCNC: 124 MG/DL (ref 70–105)
POTASSIUM BLD-SCNC: 4.6 MMOL/L (ref 3.5–5.1)
SODIUM SERPL-SCNC: 138 MMOL/L (ref 134–144)

## 2021-10-28 PROCEDURE — 80048 BASIC METABOLIC PNL TOTAL CA: CPT | Mod: ZL | Performed by: PHYSICIAN ASSISTANT

## 2021-10-28 PROCEDURE — G0463 HOSPITAL OUTPT CLINIC VISIT: HCPCS

## 2021-10-28 PROCEDURE — 99213 OFFICE O/P EST LOW 20 MIN: CPT | Performed by: PHYSICIAN ASSISTANT

## 2021-10-28 PROCEDURE — 77080 DXA BONE DENSITY AXIAL: CPT

## 2021-10-28 PROCEDURE — 36415 COLL VENOUS BLD VENIPUNCTURE: CPT | Mod: ZL | Performed by: PHYSICIAN ASSISTANT

## 2021-10-28 PROCEDURE — G0463 HOSPITAL OUTPT CLINIC VISIT: HCPCS | Mod: 25

## 2021-10-28 ASSESSMENT — PAIN SCALES - GENERAL: PAINLEVEL: NO PAIN (0)

## 2021-10-28 NOTE — NURSING NOTE
"Chief Complaint   Patient presents with     RECHECK     BP   Patient is here for a BP check and wants to talk about a Do Not Resuscitate (DNR). No other complaints or issues.    Initial /70   Pulse (!) 122   Temp 97.1  F (36.2  C) (Tympanic)   Resp 16   Wt 76.3 kg (168 lb 3.2 oz)   SpO2 97%   Breastfeeding No   BMI 31.01 kg/m   Estimated body mass index is 31.01 kg/m  as calculated from the following:    Height as of 10/6/21: 1.568 m (5' 1.75\").    Weight as of this encounter: 76.3 kg (168 lb 3.2 oz).  Medication Reconciliation: complete    FOOD SECURITY SCREENING QUESTIONS  Hunger Vital Signs:  Within the past 12 months we worried whether our food would run out before we got money to buy more. Never  Within the past 12 months the food we bought just didn't last and we didn't have money to get more. Never      Advanced Care Directive Reviewed    Fermín Clemons, JOSHUA    "

## 2021-10-28 NOTE — PROGRESS NOTES
"Nursing Notes:   Fermín Clemons LPN  10/28/2021 11:14 AM  Signed  Chief Complaint   Patient presents with     RECHECK     BP   Patient is here for a BP check and wants to talk about a Do Not Resuscitate (DNR). No other complaints or issues.    Initial /70   Pulse (!) 122   Temp 97.1  F (36.2  C) (Tympanic)   Resp 16   Wt 76.3 kg (168 lb 3.2 oz)   SpO2 97%   Breastfeeding No   BMI 31.01 kg/m   Estimated body mass index is 31.01 kg/m  as calculated from the following:    Height as of 10/6/21: 1.568 m (5' 1.75\").    Weight as of this encounter: 76.3 kg (168 lb 3.2 oz).  Medication Reconciliation: complete    FOOD SECURITY SCREENING QUESTIONS  Hunger Vital Signs:  Within the past 12 months we worried whether our food would run out before we got money to buy more. Never  Within the past 12 months the food we bought just didn't last and we didn't have money to get more. Never      Advanced Care Directive Reviewed    Fermín Clemons LPN        HPI:    Irene Saravia is a 71 year old female who presents for recheck.  Patient is currently tolerating her blood pressure medication well.  Recently increased lisinopril to 20 mg daily.  No chest pain, palpitations, problems breathing, arm pain, jaw pain, headaches.  Blood pressure has been in the stable range.  Systolic in the 120s.  Diastolic in the 60s.  No side effects noted.  Patient has been trying to work on decreasing pop intake.  Heartburn has been stable.  Had a DEXA scan today.    Patient is interested in discussing end-of-life options.    Past Medical History:   Diagnosis Date     Nicotine dependence, uncomplicated     No Comments Provided     Personal history of other complications of pregnancy, childbirth and the puerperium     No Comments Provided     Personal history of other medical treatment (CODE)     No Comments Provided       Past Surgical History:   Procedure Laterality Date     COLONOSCOPY  10/31/2016    10/31/2016,F/U 2026 normal     " OTHER SURGICAL HISTORY      ,[id number],COLPOSCOPY,LEEP, [place]     OTHER SURGICAL HISTORY      41457,NECK/THORAX PROCEDURE,Reduction of herniated disc     OTHER SURGICAL HISTORY      2014,[id number],RADIATION TREATMENT,vulvar cancer, chemotherapy       Family History   Problem Relation Age of Onset     Diabetes Father         Diabetes     Other - See Comments Father         Psychiatric illness,Alzheimer's     Breast Cancer Sister 40        Cancer-breast     Family History Negative Mother         Good Health     Hypertension Mother         Hypertension     Family History Negative Sister         Good Health     Diabetes Sister         Diabetes     Family History Negative Brother         Good Health,x3     Diabetes Brother         Diabetes     Diabetes Other         Diabetes,Positive for diabetes mellitus type 2     Colon Cancer Paternal Uncle 80        Cancer-colon     Ovarian Cancer No family hx of         Cancer-ovarian     Prostate Cancer No family hx of         Cancer-prostate     Heart Disease No family hx of         Heart Disease     Thyroid Disease No family hx of         Thyroid Disease     Blood Disease No family hx of         Blood Disease       Social History     Tobacco Use     Smoking status: Former Smoker     Packs/day: 0.25     Years: 40.00     Pack years: 10.00     Types: Cigarettes     Quit date: 2015     Years since quittin.1     Smokeless tobacco: Never Used     Tobacco comment: Quit smoking: It's been over one year since quitting   Substance Use Topics     Alcohol use: No       Current Outpatient Medications   Medication Sig Dispense Refill     famotidine (PEPCID) 20 MG tablet Take 1 tablet (20 mg) by mouth 2 times daily (before meals) 180 tablet 3     lisinopril (ZESTRIL) 20 MG tablet Take 1 tablet (20 mg) by mouth daily 90 tablet 3     simvastatin (ZOCOR) 20 MG tablet Take 1 tablet (20 mg) by mouth daily 90 tablet 3       Allergies   Allergen Reactions     Ascorbate Other (See  Comments)     Cold sores       REVIEW OF SYSTEMS:  Refer to HPI.    EXAM:   Vitals:    /70   Pulse (!) 122   Temp 97.1  F (36.2  C) (Tympanic)   Resp 16   Wt 76.3 kg (168 lb 3.2 oz)   SpO2 97%   Breastfeeding No   BMI 31.01 kg/m      General Appearance: Pleasant, alert, appropriate appearance for age. No acute distress  Chest/Respiratory Exam: Normal chest wall and respirations. Clear to auscultation.  Cardiovascular Exam: Regular rate and rhythm. S1, S2, no murmur, click, gallop, or rubs.  Skin: no rash or abnormalities  Psychiatric Exam: Alert and oriented - appropriate affect.    PHQ Depression Screen  PHQ-9 SCORE 10/2/2017   PHQ-9 Total Score 0     Labs:   Results for orders placed or performed in visit on 10/28/21   Basic Metabolic Panel     Status: Abnormal   Result Value Ref Range    Sodium 138 134 - 144 mmol/L    Potassium 4.6 3.5 - 5.1 mmol/L    Chloride 100 98 - 107 mmol/L    Carbon Dioxide (CO2) 30 21 - 31 mmol/L    Anion Gap 8 3 - 14 mmol/L    Urea Nitrogen 11 7 - 25 mg/dL    Creatinine 0.86 0.60 - 1.20 mg/dL    Calcium 9.9 8.6 - 10.3 mg/dL    Glucose 124 (H) 70 - 105 mg/dL    GFR Estimate 69 >60 mL/min/1.73m2   Results for orders placed or performed during the hospital encounter of 10/28/21   DX Hip/Pelvis/Spine     Status: None    Narrative    PROCEDURE: DX HIP/PELVIS/SPINE 10/28/2021 11:23 AM    HISTORY: screening; Screening for osteoporosis; Asymptomatic  menopausal state    COMPARISONS: None.    TECHNIQUE: Bone mineral density was measured in the lumbar spine and  in both hips.    FINDINGS: Lowest bone mineral density is in the spine with a T score  is -1.8. This patient is considered osteopenic.    Estimated ten-year probability of major osteoporotic fracture is 14.3%  with a risk of hip fracture of 2.2%.         Impression    IMPRESSION: Osteopenia.    ADORE MIJARES MD         SYSTEM ID:  [id number]      ASSESSMENT AND PLAN:      ICD-10-CM    1. Hypertension, unspecified type  I10  Basic Metabolic Panel     Basic Metabolic Panel     Hypertension: Completed BMP for monitoring.  Lab is stable.  Encouraged continued good diet, exercise, decreasing sugar and salt in her diet.    Discussed end-of-life options.  Patient will complete advanced healthcare directive and discuss options with her family.     Doris Hughes PA-C ..................10/28/2021 11:16 AM

## 2021-10-28 NOTE — LETTER
October 28, 2021      Irenemaulik Saravia  [address] [address]        Dear ,    We are writing to inform you of your test results.    Your electrolytes and kidney function are stable.    Resulted Orders   Basic Metabolic Panel   Result Value Ref Range    Sodium 138 134 - 144 mmol/L    Potassium 4.6 3.5 - 5.1 mmol/L    Chloride 100 98 - 107 mmol/L    Carbon Dioxide (CO2) 30 21 - 31 mmol/L    Anion Gap 8 3 - 14 mmol/L    Urea Nitrogen 11 7 - 25 mg/dL    Creatinine 0.86 0.60 - 1.20 mg/dL    Calcium 9.9 8.6 - 10.3 mg/dL    Glucose 124 (H) 70 - 105 mg/dL    GFR Estimate 69 >60 mL/min/1.73m2      Comment:      As of July 11, 2021, eGFR is calculated by the CKD-EPI creatinine equation, without race adjustment. eGFR can be influenced by muscle mass, exercise, and diet. The reported eGFR is an estimation only and is only applicable if the renal function is stable.       If you have any questions or concerns, please call the clinic at the number listed above.       Sincerely,      Doris Hughes PA-C

## 2022-10-10 ENCOUNTER — HOSPITAL ENCOUNTER (OUTPATIENT)
Dept: MAMMOGRAPHY | Facility: OTHER | Age: 72
Discharge: HOME | End: 2022-10-10
Attending: PHYSICIAN ASSISTANT
Payer: MEDICARE

## 2022-10-10 ENCOUNTER — OFFICE VISIT (OUTPATIENT)
Dept: FAMILY MEDICINE | Facility: OTHER | Age: 72
End: 2022-10-10
Attending: PHYSICIAN ASSISTANT
Payer: MEDICARE

## 2022-10-10 VITALS
BODY MASS INDEX: 30.11 KG/M2 | DIASTOLIC BLOOD PRESSURE: 83 MMHG | HEIGHT: 62 IN | SYSTOLIC BLOOD PRESSURE: 124 MMHG | RESPIRATION RATE: 18 BRPM | TEMPERATURE: 97.8 F | HEART RATE: 115 BPM | OXYGEN SATURATION: 96 % | WEIGHT: 163.6 LBS

## 2022-10-10 DIAGNOSIS — Z12.31 VISIT FOR SCREENING MAMMOGRAM: ICD-10-CM

## 2022-10-10 DIAGNOSIS — R73.03 PREDIABETES: ICD-10-CM

## 2022-10-10 DIAGNOSIS — E78.5 HYPERLIPIDEMIA LDL GOAL <100: ICD-10-CM

## 2022-10-10 DIAGNOSIS — Z23 NEEDS FLU SHOT: ICD-10-CM

## 2022-10-10 DIAGNOSIS — Z00.00 ENCOUNTER FOR MEDICARE ANNUAL WELLNESS EXAM: Primary | ICD-10-CM

## 2022-10-10 DIAGNOSIS — I10 HYPERTENSION, UNSPECIFIED TYPE: ICD-10-CM

## 2022-10-10 DIAGNOSIS — R12 HEARTBURN: ICD-10-CM

## 2022-10-10 LAB
ANION GAP SERPL CALCULATED.3IONS-SCNC: 10 MMOL/L (ref 3–14)
BUN SERPL-MCNC: 13 MG/DL (ref 7–25)
CALCIUM SERPL-MCNC: 10.1 MG/DL (ref 8.6–10.3)
CHLORIDE BLD-SCNC: 98 MMOL/L (ref 98–107)
CHOLEST SERPL-MCNC: 167 MG/DL
CO2 SERPL-SCNC: 28 MMOL/L (ref 21–31)
CREAT SERPL-MCNC: 0.98 MG/DL (ref 0.6–1.2)
FASTING STATUS PATIENT QL REPORTED: NORMAL
GFR SERPL CREATININE-BSD FRML MDRD: 62 ML/MIN/1.73M2
GLUCOSE BLD-MCNC: 125 MG/DL (ref 70–105)
HBA1C MFR BLD: 6.1 % (ref 4–6.2)
HDLC SERPL-MCNC: 61 MG/DL (ref 23–92)
LDLC SERPL CALC-MCNC: 87 MG/DL
NONHDLC SERPL-MCNC: 106 MG/DL
POTASSIUM BLD-SCNC: 4.4 MMOL/L (ref 3.5–5.1)
SODIUM SERPL-SCNC: 136 MMOL/L (ref 134–144)
TRIGL SERPL-MCNC: 93 MG/DL

## 2022-10-10 PROCEDURE — 82310 ASSAY OF CALCIUM: CPT | Mod: ZL | Performed by: PHYSICIAN ASSISTANT

## 2022-10-10 PROCEDURE — 77067 SCR MAMMO BI INCL CAD: CPT

## 2022-10-10 PROCEDURE — G0439 PPPS, SUBSEQ VISIT: HCPCS | Performed by: PHYSICIAN ASSISTANT

## 2022-10-10 PROCEDURE — 36415 COLL VENOUS BLD VENIPUNCTURE: CPT | Mod: ZL | Performed by: PHYSICIAN ASSISTANT

## 2022-10-10 PROCEDURE — 83036 HEMOGLOBIN GLYCOSYLATED A1C: CPT | Mod: ZL | Performed by: PHYSICIAN ASSISTANT

## 2022-10-10 PROCEDURE — 80061 LIPID PANEL: CPT | Mod: ZL | Performed by: PHYSICIAN ASSISTANT

## 2022-10-10 PROCEDURE — G0008 ADMIN INFLUENZA VIRUS VAC: HCPCS

## 2022-10-10 RX ORDER — FAMOTIDINE 20 MG/1
20 TABLET, FILM COATED ORAL
Qty: 180 TABLET | Refills: 3 | Status: SHIPPED | OUTPATIENT
Start: 2022-10-10 | End: 2023-10-10

## 2022-10-10 RX ORDER — SIMVASTATIN 20 MG
20 TABLET ORAL DAILY
Qty: 90 TABLET | Refills: 3 | Status: SHIPPED | OUTPATIENT
Start: 2022-10-10 | End: 2023-10-10

## 2022-10-10 RX ORDER — LISINOPRIL 20 MG/1
20 TABLET ORAL DAILY
Qty: 90 TABLET | Refills: 3 | Status: SHIPPED | OUTPATIENT
Start: 2022-10-10 | End: 2023-10-10

## 2022-10-10 ASSESSMENT — ENCOUNTER SYMPTOMS
JOINT SWELLING: 0
SHORTNESS OF BREATH: 0
COUGH: 0
ARTHRALGIAS: 0
ABDOMINAL PAIN: 0
HEMATOCHEZIA: 0
DYSURIA: 0
MYALGIAS: 0
HEADACHES: 0
DIZZINESS: 0
CHILLS: 0
NERVOUS/ANXIOUS: 1
CONSTIPATION: 0
FEVER: 0
PARESTHESIAS: 0
WEAKNESS: 0
BREAST MASS: 0
SORE THROAT: 0
NAUSEA: 0
HEMATURIA: 0
HEARTBURN: 0
DIARRHEA: 0
FREQUENCY: 0
EYE PAIN: 0
PALPITATIONS: 0

## 2022-10-10 ASSESSMENT — PAIN SCALES - GENERAL: PAINLEVEL: NO PAIN (0)

## 2022-10-10 ASSESSMENT — ACTIVITIES OF DAILY LIVING (ADL): CURRENT_FUNCTION: NO ASSISTANCE NEEDED

## 2022-10-10 NOTE — NURSING NOTE
Pt presents to clinic today for a medicare wellness. No concerns.       FOOD SECURITY SCREENING QUESTIONS:    The next two questions are to help us understand your food security.  If you are feeling you need any assistance in this area, we have resources available to support you today.    Hunger Vital Signs:  Within the past 12 months we worried whether our food would run out before we got money to buy more. Never  Within the past 12 months the food we bought just didn't last and we didn't have money to get more. Never        Advance care directive on file? no  Advance care directive provided to patient? no     Medication Reconciliation: complete  Greta Collado LPN,LPN on 10/10/2022 at 1:05 PM

## 2022-10-10 NOTE — PROGRESS NOTES
"SUBJECTIVE:   Irene is a 72 year old who presents for Preventive Visit.      Patient has been advised of split billing requirements and indicates understanding: Yes  Are you in the first 12 months of your Medicare coverage?  No    Healthy Habits:     In general, how would you rate your overall health?  Good    Frequency of exercise:  2-3 days/week    Duration of exercise:  15-30 minutes    Do you usually eat at least 4 servings of fruit and vegetables a day, include whole grains    & fiber and avoid regularly eating high fat or \"junk\" foods?  Yes    Taking medications regularly:  Yes    Medication side effects:  None    Ability to successfully perform activities of daily living:  No assistance needed    Home Safety:  No safety concerns identified    Hearing Impairment:  No hearing concerns    In the past 6 months, have you been bothered by leaking of urine?  No    In general, how would you rate your overall mental or emotional health?  Excellent      PHQ-2 Total Score: 0    Additional concerns today:  No    Do you feel safe in your environment? Yes    Have you ever done Advance Care Planning? (For example, a Health Directive, POLST, or a discussion with a medical provider or your loved ones about your wishes): Yes, patient states has an Advance Care Planning document and will bring a copy to the clinic.       Fall risk  Fallen 2 or more times in the past year?: No  Any fall with injury in the past year?: No    Cognitive Screening   1) Repeat 3 items (Leader, Season, Table)    2) Clock draw: NORMAL  3) 3 item recall: Recalls 3 objects  Results: 3 items recalled: COGNITIVE IMPAIRMENT LESS LIKELY    Mini-CogTM Copyright ERICH [employer]. Licensed by the author for use in [facility]; reprinted with permission (kasey@.[email or web address]). All rights reserved.      Do you have sleep apnea, excessive snoring or daytime drowsiness?: no    Reviewed and updated as needed this visit by clinical staff   Tobacco  Allergies  Meds  " Problems  Med Hx  Surg Hx  Fam Hx  Soc   Hx        Reviewed and updated as needed this visit by Provider   Tobacco  Allergies  Meds  Problems  Med Hx  Surg Hx  Fam Hx         Social History     Tobacco Use     Smoking status: Former     Packs/day: 0.25     Years: 40.00     Pack years: 10.00     Types: Cigarettes     Quit date: 2015     Years since quittin.1     Smokeless tobacco: Never     Tobacco comments:     Quit smoking: It's been over one year since quitting   Substance Use Topics     Alcohol use: No         Alcohol Use 10/10/2022   Prescreen: >3 drinks/day or >7 drinks/week? Not Applicable       History of vulvar squamous cell carcinoma:  Last saw Dr. Rachel Lucero approx 3 years ago. NO further paps needed per recommendations unless she has concerns or symptoms.     Hypertension and hyperlipidemia: No acute concerns at this time.  Tolerates her medication well.  No side effects noted.      History of heartburn in the past.  Takes Pepcid twice daily.  No side effects noted with medication.    Current providers sharing in care for this patient include:   Patient Care Team:  Doris Hughes PA-C as PCP - General (Family Medicine)  Doris Hughes PA-C as Assigned PCP    The following health maintenance items are reviewed in Epic and correct as of today:  Health Maintenance   Topic Date Due     HEPATITIS B IMMUNIZATION (1 of 3 - 3-dose series) Never done     ZOSTER IMMUNIZATION (1 of 2) Never done     COVID-19 Vaccine (4 - Booster for Moderna series) 2022     MAMMO SCREENING  10/06/2023     MEDICARE ANNUAL WELLNESS VISIT  10/10/2023     FALL RISK ASSESSMENT  10/10/2023     DTAP/TDAP/TD IMMUNIZATION (2 - Td or Tdap) 2024     LIPID  10/06/2026     COLORECTAL CANCER SCREENING  10/31/2026     ADVANCE CARE PLANNING  10/10/2027     DEXA  10/28/2036     HEPATITIS C SCREENING  Completed     PHQ-2 (once per calendar year)  Completed     INFLUENZA VACCINE  Completed     Pneumococcal  Vaccine: 65+ Years  Completed     IPV IMMUNIZATION  Aged Out     MENINGITIS IMMUNIZATION  Aged Out     LUNG CANCER SCREENING  Discontinued     Labs reviewed in EPIC  BP Readings from Last 3 Encounters:   10/10/22 124/83   10/28/21 124/70   10/06/21 (!) 152/60    Wt Readings from Last 3 Encounters:   10/10/22 74.2 kg (163 lb 9.6 oz)   10/28/21 76.3 kg (168 lb 3.2 oz)   10/06/21 76.1 kg (167 lb 12.8 oz)                  Patient Active Problem List   Diagnosis     Breast cancer screening     Family history of hypertension     Health care maintenance     Displacement of intervertebral disc     History of loop electrical excision procedure (LEEP)     History of tobacco use     Papanicolaou smear of cervix with atypical squamous cells of undetermined significance (ASC-US)     Primary vulvar squamous cell carcinoma (H)     Seborrheic keratoses, inflamed     Tobacco abuse     Prediabetes     Hyperlipidemia LDL goal <100     Hypertension, unspecified type     Heartburn     Past Surgical History:   Procedure Laterality Date     COLONOSCOPY  10/31/2016    10/31/2016,F/U  normal     OTHER SURGICAL HISTORY      ,[id number],COLPOSCOPY,LEEP, [place]     OTHER SURGICAL HISTORY      ,NECK/THORAX PROCEDURE,Reduction of herniated disc     OTHER SURGICAL HISTORY      2014,2083,RADIATION TREATMENT,vulvar cancer, chemotherapy       Social History     Tobacco Use     Smoking status: Former     Packs/day: 0.25     Years: 40.00     Pack years: 10.00     Types: Cigarettes     Quit date: 2015     Years since quittin.1     Smokeless tobacco: Never     Tobacco comments:     Quit smoking: It's been over one year since quitting   Substance Use Topics     Alcohol use: No     Family History   Problem Relation Age of Onset     Diabetes Father         Diabetes     Other - See Comments Father         Psychiatric illness,Alzheimer's     Breast Cancer Sister 40        Cancer-breast     Family History Negative Mother         Good  Health     Hypertension Mother         Hypertension     Family History Negative Sister         Good Health     Diabetes Sister         Diabetes     Family History Negative Brother         Good Health,x3     Diabetes Brother         Diabetes     Diabetes Other         Diabetes,Positive for diabetes mellitus type 2     Colon Cancer Paternal Uncle 80        Cancer-colon     Ovarian Cancer No family hx of         Cancer-ovarian     Prostate Cancer No family hx of         Cancer-prostate     Heart Disease No family hx of         Heart Disease     Thyroid Disease No family hx of         Thyroid Disease     Blood Disease No family hx of         Blood Disease         Current Outpatient Medications   Medication Sig Dispense Refill     calcium citrate-vitamin D (CITRACAL) 315-200 MG-UNIT TABS per tablet Take 1 tablet by mouth 2 times daily       famotidine (PEPCID) 20 MG tablet Take 1 tablet (20 mg) by mouth 2 times daily (before meals) 180 tablet 3     lisinopril (ZESTRIL) 20 MG tablet Take 1 tablet (20 mg) by mouth daily 90 tablet 3     simvastatin (ZOCOR) 20 MG tablet Take 1 tablet (20 mg) by mouth daily 90 tablet 3     Allergies   Allergen Reactions     Ascorbate Other (See Comments)     Cold sores     Recent Labs   Lab Test 10/28/21  1143 10/06/21  1152 10/08/20  1116 10/16/19  1217 10/02/18  1254   A1C  --  6.3*  --   --   --    LDL  --  97  --  138* 153*   HDL  --  52  --  62 57   TRIG  --  100  --  97 108   ALT  --   --   --  12  --    CR 0.86 0.84 0.86 0.87 0.70   GFRESTIMATED 69 71 66 65 84   GFRESTBLACK  --   --  79 79 >90   POTASSIUM 4.6 4.0 3.8 4.7 4.2              Review of Systems   Constitutional: Negative for chills and fever.   HENT: Negative for congestion, ear pain, hearing loss and sore throat.    Eyes: Negative for pain and visual disturbance.   Respiratory: Negative for cough and shortness of breath.    Cardiovascular: Negative for chest pain, palpitations and peripheral edema.   Gastrointestinal:  "Negative for abdominal pain, constipation, diarrhea, heartburn, hematochezia and nausea.   Breasts:  Negative for tenderness, breast mass and discharge.   Genitourinary: Negative for dysuria, frequency, genital sores, hematuria, pelvic pain, urgency, vaginal bleeding and vaginal discharge.   Musculoskeletal: Negative for arthralgias, joint swelling and myalgias.   Skin: Negative for rash.   Neurological: Negative for dizziness, weakness, headaches and paresthesias.   Psychiatric/Behavioral: Negative for mood changes. The patient is nervous/anxious.        OBJECTIVE:   /83 (BP Location: Right arm, Patient Position: Sitting, Cuff Size: Adult Large)   Pulse 115   Temp 97.8  F (36.6  C) (Tympanic)   Resp 18   Ht 1.562 m (5' 1.5\")   Wt 74.2 kg (163 lb 9.6 oz)   SpO2 96%   BMI 30.41 kg/m   Estimated body mass index is 30.41 kg/m  as calculated from the following:    Height as of this encounter: 1.562 m (5' 1.5\").    Weight as of this encounter: 74.2 kg (163 lb 9.6 oz).  Physical Exam  Constitutional:       General: She is not in acute distress.     Appearance: She is well-developed.   HENT:      Right Ear: Tympanic membrane and external ear normal.      Left Ear: Tympanic membrane and external ear normal.      Nose: Nose normal.      Mouth/Throat:      Pharynx: No oropharyngeal exudate.   Eyes:      General:         Right eye: No discharge.         Left eye: No discharge.      Conjunctiva/sclera: Conjunctivae normal.      Pupils: Pupils are equal, round, and reactive to light.   Neck:      Thyroid: No thyromegaly.      Trachea: No tracheal deviation.   Cardiovascular:      Rate and Rhythm: Normal rate and regular rhythm.      Pulses: Normal pulses.      Heart sounds: Normal heart sounds, S1 normal and S2 normal. No murmur heard.    No friction rub. No S3 or S4 sounds.   Pulmonary:      Effort: Pulmonary effort is normal. No respiratory distress.      Breath sounds: Normal breath sounds. No wheezing or rales. "   Abdominal:      General: Bowel sounds are normal.      Palpations: Abdomen is soft. There is no mass.      Tenderness: There is no abdominal tenderness.   Musculoskeletal:         General: Normal range of motion.      Cervical back: Neck supple.   Lymphadenopathy:      Cervical: No cervical adenopathy.   Skin:     General: Skin is warm and dry.      Findings: No rash.   Neurological:      Mental Status: She is alert and oriented to person, place, and time.      Motor: No abnormal muscle tone.      Deep Tendon Reflexes: Reflexes are normal and symmetric.   Psychiatric:         Thought Content: Thought content normal.         Judgment: Judgment normal.           Diagnostic Test Results:  Labs reviewed in Epic    ASSESSMENT / PLAN:       ICD-10-CM    1. Encounter for Medicare annual wellness exam  Z00.00       2. Heartburn  R12 famotidine (PEPCID) 20 MG tablet      3. Hypertension, unspecified type  I10 lisinopril (ZESTRIL) 20 MG tablet     simvastatin (ZOCOR) 20 MG tablet     Basic Metabolic Panel     Basic Metabolic Panel      4. Hyperlipidemia LDL goal <100  E78.5 simvastatin (ZOCOR) 20 MG tablet     Lipid Panel     Lipid Panel     CANCELED: Lipid Panel      5. Prediabetes  R73.03 Basic Metabolic Panel     Hemoglobin A1c     Hemoglobin A1c     Basic Metabolic Panel      6. Needs flu shot  Z23 FLU SHOT 65+ (FLUZONE HD)        Last mammogram was 10/6/2021.  This being repeated today.   DEXA last completed 10/28/2021.  Osteopenia.   Pap deferred due to age >65.    Colon cancer screening last completed 10/31/2016 - normal, repeat in 10 years.     Abdominal aortic aneurysm screening last completed 10/10/2017 - normal.    Hepatitis C screening - 10/6/2021, negative.    Vaccines reviewed - needs flu shot.  History of tobacco abuse, need for low dose chest CT - former, na.     Patient was given a flu shot today.  History of prediabetes.  Completed lab work for monitoring.  Encouraged good diet and  "exercise.    Hypertension and hyperlipidemia: Completed lab work for monitoring.  Continue medications as previously prescribed.  Refilled medication.    Heartburn: Refilled medication.  No acute concerns at this time.  Repeat physical in 1 year.    Patient has been advised of split billing requirements and indicates understanding: Yes    COUNSELING:  Reviewed preventive health counseling, as reflected in patient instructions       Regular exercise       Healthy diet/nutrition       Vision screening       Hearing screening       Dental care       Osteoporosis prevention/bone health       Consider lung cancer screening for ages 55-80 years (77 for Medicare) and 20 pack-year smoking history         Colon cancer screening       Hepatitis C screening       HIV screening for high risk patient    Estimated body mass index is 30.41 kg/m  as calculated from the following:    Height as of this encounter: 1.562 m (5' 1.5\").    Weight as of this encounter: 74.2 kg (163 lb 9.6 oz).        She reports that she quit smoking about 7 years ago. Her smoking use included cigarettes. She has a 10.00 pack-year smoking history. She has never used smokeless tobacco.      Appropriate preventive services were discussed with this patient, including applicable screening as appropriate for cardiovascular disease, diabetes, osteopenia/osteoporosis, and glaucoma.  As appropriate for age/gender, discussed screening for colorectal cancer, prostate cancer, breast cancer, and cervical cancer. Checklist reviewing preventive services available has been given to the patient.    Reviewed patients plan of care and provided an AVS. The Basic Care Plan (routine screening as documented in Health Maintenance) for Irene meets the Care Plan requirement. This Care Plan has been established and reviewed with the Patient.    Counseling Resources:  ATP IV Guidelines  Pooled Cohorts Equation Calculator  Breast Cancer Risk Calculator  Breast Cancer: Medication to " Reduce Risk  FRAX Risk Assessment  ICSI Preventive Guidelines  Dietary Guidelines for Americans, 2010  USDA's MyPlate  ASA Prophylaxis  Lung CA Screening    Doris Hughes PA-C  [facility]    Identified Health Risks:

## 2022-10-10 NOTE — LETTER
October 11, 2022      Irene J Saravia  [address]  [facility] [address]        Dear ,    We are writing to inform you of your test results.    Your cholesterol is in the normal range.  Your electrolytes and kidney function are stable.  Your hemoglobin has improved to 6.1.  Please continue working on good diet, exercise, weight loss, and decreasing sugar and carbohydrates in your diet.    Resulted Orders   Lipid Panel   Result Value Ref Range    Cholesterol 167 <200 mg/dL    Triglycerides 93 <150 mg/dL    Direct Measure HDL 61 23 - 92 mg/dL    LDL Cholesterol Calculated 87 <=100 mg/dL    Non HDL Cholesterol 106 <130 mg/dL    Patient Fasting > 8hrs? Unknown     Narrative    Cholesterol  Desirable:  <200 mg/dL    Triglycerides  Normal:  Less than 150 mg/dL  Borderline High:  150-199 mg/dL  High:  200-499 mg/dL  Very High:  Greater than or equal to 500 mg/dL    Direct Measure HDL  Female:  Greater than or equal to 50 mg/dL   Male:  Greater than or equal to 40 mg/dL    LDL Cholesterol  Desirable:  <100mg/dL  Above Desirable:  100-129 mg/dL   Borderline High:  130-159 mg/dL   High:  160-189 mg/dL   Very High:  >= 190 mg/dL    Non HDL Cholesterol  Desirable:  130 mg/dL  Above Desirable:  130-159 mg/dL  Borderline High:  160-189 mg/dL  High:  190-219 mg/dL  Very High:  Greater than or equal to 220 mg/dL   Hemoglobin A1c   Result Value Ref Range    Hemoglobin A1C 6.1 4.0 - 6.2 %   Basic Metabolic Panel   Result Value Ref Range    Sodium 136 134 - 144 mmol/L    Potassium 4.4 3.5 - 5.1 mmol/L    Chloride 98 98 - 107 mmol/L    Carbon Dioxide (CO2) 28 21 - 31 mmol/L    Anion Gap 10 3 - 14 mmol/L    Urea Nitrogen 13 7 - 25 mg/dL    Creatinine 0.98 0.60 - 1.20 mg/dL    Calcium 10.1 8.6 - 10.3 mg/dL    Glucose 125 (H) 70 - 105 mg/dL    GFR Estimate 62 >60 mL/min/1.73m2      Comment:      Effective December 21, 2021 eGFRcr in adults is calculated using the 2021 CKD-EPI creatinine equation which includes age and  gender (Ridge et al., NEJM, DOI: 10.1056/WNJLhk6726232)       If you have any questions or concerns, please call the clinic at the number listed above.       Sincerely,      Doris Hughes PA-C

## 2022-10-10 NOTE — PATIENT INSTRUCTIONS
"  Patient Education   Personalized Prevention Plan  You are due for the preventive services outlined below.  Your care team is available to assist you in scheduling these services.  If you have already completed any of these items, please share that information with your care team to update in your medical record.  Health Maintenance Due   Topic Date Due    Hepatitis B Vaccine (1 of 3 - 3-dose series) Never done    Zoster (Shingles) Vaccine (1 of 2) Never done    LUNG CANCER SCREENING  Never done    COVID-19 Vaccine (4 - Booster for Moderna series) 01/18/2022    Flu Vaccine (1) 09/01/2022          Healthy Strategies  Eat at least 3 meals a day and never skip breakfast.  Eat more slowly.  Decrease portion size.  Provide structure by using meal replacement bars or shakes, and/or low calorie frozen meals.  For good nutrition incorporate fruit, vegetables, whole grains, lean protein, and low-fat dairy.  Remove trigger foods from yourenvironment to avoid impulse eating.  Increase physical activity: get a pedometer and aim for 10,000 steps a day or 30-35 minutes of activity 5 days per week.  Weigh yourself daily or at least weekly.  Keep a record of what you eat and your activity.  Establish a support system such as afriend, group or program.    11. Read Mookie Serrano's \"Eat to Live\". Remember it is important to have a minimum of 1200 calories a day, okay to use olive oil, 40 grams of fiber daily. No more than two servings (the size of your palm) of red meat a week.     Please consider the following general health recommendations:    Eat a quality diet (generally, low in simple sugars, starches, cholesterol and saturated fat.)    Please get 1200 mg of calcium in divided doses with 800 units vitamin D in your diet daily. Take supplements as needed to obtain full recommended amounts.     Stay physically active. Regular walking or other exercise is one of the best ways to minimize pain of arthritis; maintain independence and " mobility; maintain bone strength; maintain conditioning of your heart. Find something you enjoy and a friend to do it with you.    Maintain ideal weight. Your Body mass index is Body mass index is 30.41 kg/m .. Generally a BMI of 20-25 is considered ideal. Overweight is defined as 25-30, obese is 30-35 and markedly obese is greater than 35.    Apply sun block (SPF 25 or greater) on exposed skin anytime you are out in the sun to prevent skin cancer.     Wear a seatbelt whenever you are in a car.    Obtain a flu shot every fall.    You should have a tetanus booster at least once every 10 years.    Schedule a mammogram annually starting at the age of 40 years old unless recommended earlier by your primary care provider. Come for a general exam once yearly.    Colonoscopy (an exam of the colon) is recommended every 10 years after the age of 45 to screen for colon cancer. (More often if you are at increased risk.)    A vaccine to reduce your chances of getting shingles is available if you are over 50. Information about the vaccine is available through the clinic or at:  (https://www.cdc.gov/vaccines/vpd/shingles/public/shingrix/index.html)    Consider a bone density study every 2-5 years to see if you are at increased risk for fracture. If you have osteoporosis, medicine to strengthen your bones can significantly reduce your risk of fracture, back pain, and loss of height.    Receive a pneumonia shot series after the age of 65 (repeat in 5 years if you have other risk factors). This does not prevent all types of pneumonia, but reduces the risk of the worst bacterial causes of pneumonia.    Check blood sugar annually. Cholesterol annually unless you have had a normal level when last checked within 5 years.     I recommend that you have a general physical exam every year. .

## 2023-10-10 ENCOUNTER — OFFICE VISIT (OUTPATIENT)
Dept: FAMILY MEDICINE | Facility: OTHER | Age: 73
End: 2023-10-10
Attending: PHYSICIAN ASSISTANT
Payer: MEDICARE

## 2023-10-10 ENCOUNTER — HOSPITAL ENCOUNTER (OUTPATIENT)
Dept: MAMMOGRAPHY | Facility: OTHER | Age: 73
Discharge: HOME | End: 2023-10-10
Attending: PHYSICIAN ASSISTANT
Payer: MEDICARE

## 2023-10-10 VITALS
HEART RATE: 73 BPM | RESPIRATION RATE: 20 BRPM | WEIGHT: 162 LBS | OXYGEN SATURATION: 92 % | HEIGHT: 62 IN | TEMPERATURE: 97.7 F | SYSTOLIC BLOOD PRESSURE: 138 MMHG | DIASTOLIC BLOOD PRESSURE: 78 MMHG | BODY MASS INDEX: 29.81 KG/M2

## 2023-10-10 DIAGNOSIS — I10 HYPERTENSION, UNSPECIFIED TYPE: ICD-10-CM

## 2023-10-10 DIAGNOSIS — Z12.31 VISIT FOR SCREENING MAMMOGRAM: ICD-10-CM

## 2023-10-10 DIAGNOSIS — Z23 NEEDS FLU SHOT: ICD-10-CM

## 2023-10-10 DIAGNOSIS — E78.5 HYPERLIPIDEMIA LDL GOAL <100: ICD-10-CM

## 2023-10-10 DIAGNOSIS — Z00.00 ENCOUNTER FOR MEDICARE ANNUAL WELLNESS EXAM: Primary | ICD-10-CM

## 2023-10-10 DIAGNOSIS — R12 HEARTBURN: ICD-10-CM

## 2023-10-10 DIAGNOSIS — R73.03 PREDIABETES: ICD-10-CM

## 2023-10-10 LAB
ANION GAP SERPL CALCULATED.3IONS-SCNC: 12 MMOL/L (ref 7–15)
BUN SERPL-MCNC: 12.5 MG/DL (ref 8–23)
CALCIUM SERPL-MCNC: 10.2 MG/DL (ref 8.8–10.2)
CHLORIDE SERPL-SCNC: 98 MMOL/L (ref 98–107)
CHOLEST SERPL-MCNC: 179 MG/DL
CREAT SERPL-MCNC: 0.81 MG/DL (ref 0.51–0.95)
DEPRECATED HCO3 PLAS-SCNC: 27 MMOL/L (ref 22–29)
EGFRCR SERPLBLD CKD-EPI 2021: 77 ML/MIN/1.73M2
GLUCOSE SERPL-MCNC: 119 MG/DL (ref 70–99)
HBA1C MFR BLD: 6.2 % (ref 4–6.2)
HDLC SERPL-MCNC: 67 MG/DL
LDLC SERPL CALC-MCNC: 96 MG/DL
NONHDLC SERPL-MCNC: 112 MG/DL
POTASSIUM SERPL-SCNC: 3.9 MMOL/L (ref 3.4–5.3)
SODIUM SERPL-SCNC: 137 MMOL/L (ref 135–145)
TRIGL SERPL-MCNC: 79 MG/DL

## 2023-10-10 PROCEDURE — 80048 BASIC METABOLIC PNL TOTAL CA: CPT | Mod: ZL | Performed by: PHYSICIAN ASSISTANT

## 2023-10-10 PROCEDURE — 36415 COLL VENOUS BLD VENIPUNCTURE: CPT | Mod: ZL | Performed by: PHYSICIAN ASSISTANT

## 2023-10-10 PROCEDURE — G0439 PPPS, SUBSEQ VISIT: HCPCS | Performed by: PHYSICIAN ASSISTANT

## 2023-10-10 PROCEDURE — 80061 LIPID PANEL: CPT | Mod: ZL | Performed by: PHYSICIAN ASSISTANT

## 2023-10-10 PROCEDURE — 77067 SCR MAMMO BI INCL CAD: CPT

## 2023-10-10 PROCEDURE — 83036 HEMOGLOBIN GLYCOSYLATED A1C: CPT | Mod: ZL | Performed by: PHYSICIAN ASSISTANT

## 2023-10-10 PROCEDURE — G0008 ADMIN INFLUENZA VIRUS VAC: HCPCS

## 2023-10-10 RX ORDER — SIMVASTATIN 20 MG
20 TABLET ORAL DAILY
Qty: 90 TABLET | Refills: 3 | Status: SHIPPED | OUTPATIENT
Start: 2023-10-10 | End: 2024-10-02

## 2023-10-10 RX ORDER — FAMOTIDINE 20 MG/1
20 TABLET, FILM COATED ORAL
Qty: 180 TABLET | Refills: 3 | Status: SHIPPED | OUTPATIENT
Start: 2023-10-10 | End: 2024-10-02

## 2023-10-10 RX ORDER — LISINOPRIL 20 MG/1
20 TABLET ORAL DAILY
Qty: 90 TABLET | Refills: 3 | Status: SHIPPED | OUTPATIENT
Start: 2023-10-10 | End: 2024-10-02

## 2023-10-10 ASSESSMENT — ENCOUNTER SYMPTOMS
FEVER: 0
HEMATURIA: 0
DIARRHEA: 0
NERVOUS/ANXIOUS: 0
CONSTIPATION: 0
HEARTBURN: 0
JOINT SWELLING: 0
FREQUENCY: 0
EYE PAIN: 0
HEMATOCHEZIA: 0
HEADACHES: 0
COUGH: 0
DIZZINESS: 0
CHILLS: 0
ARTHRALGIAS: 0
ABDOMINAL PAIN: 0

## 2023-10-10 ASSESSMENT — PAIN SCALES - GENERAL: PAINLEVEL: NO PAIN (0)

## 2023-10-10 ASSESSMENT — ACTIVITIES OF DAILY LIVING (ADL): CURRENT_FUNCTION: NO ASSISTANCE NEEDED

## 2023-10-10 NOTE — PROGRESS NOTES
"SUBJECTIVE:   Irene is a 73 year old who presents for Preventive Visit.      10/10/2023    12:46 PM   Additional Questions   Roomed by Juana AMARAL LPN   Accompanied by self       Are you in the first 12 months of your Medicare coverage?  No    Healthy Habits:     In general, how would you rate your overall health?  Good    Frequency of exercise:  2-3 days/week    Duration of exercise:  Less than 15 minutes    Do you usually eat at least 4 servings of fruit and vegetables a day, include whole grains    & fiber and avoid regularly eating high fat or \"junk\" foods?  Yes    Taking medications regularly:  Yes    Medication side effects:  None    Ability to successfully perform activities of daily living:  No assistance needed    Home Safety:  No safety concerns identified    Hearing Impairment:  No hearing concerns    In the past 6 months, have you been bothered by leaking of urine?  No    In general, how would you rate your overall mental or emotional health?  Excellent    Additional concerns today:  No      Today's PHQ-2 Score:       10/10/2023    12:43 PM   PHQ-2 ( 1999 Pfizer)   Q1: Little interest or pleasure in doing things 0   Q2: Feeling down, depressed or hopeless 0   PHQ-2 Score 0   Q1: Little interest or pleasure in doing things Not at all   Q2: Feeling down, depressed or hopeless Not at all   PHQ-2 Score 0     Heartburn: Patient's heartburn has been doing well.  Tolerates medication well.  No side effects noted.    Hypertension: Currently taking lisinopril 20 mg daily.  No side effects noted.  No cough, chest pain, palpitations, or problems breathing.    Hypercholesterolemia: Currently tolerating simvastatin well.    No side effects noted.  Busy outside working and walking.    Have you ever done Advance Care Planning? (For example, a Health Directive, POLST, or a discussion with a medical provider or your loved ones about your wishes): No, advance care planning information given to patient to review.  Patient " plans to discuss their wishes with loved ones or provider.         Fall risk  Fallen 2 or more times in the past year?: No  Any fall with injury in the past year?: No    Cognitive Screening   1) Repeat 3 items (Leader, Season, Table)    2) Clock draw: NORMAL  3) 3 item recall: Recalls 3 objects  Results: 3 items recalled: COGNITIVE IMPAIRMENT LESS LIKELY    Mini-CogTM Copyright ERICH [place]. Licensed by the author for use in [facility]; reprinted with permission ([email or web address]). All rights reserved.      Do you have sleep apnea, excessive snoring or daytime drowsiness? : no    Reviewed and updated as needed this visit by clinical staff   Tobacco  Allergies  Meds  Problems  Med Hx  Surg Hx  Fam Hx  Soc   Hx        Reviewed and updated as needed this visit by Provider   Tobacco  Allergies  Meds  Problems  Med Hx  Surg Hx  Fam Hx         Social History     Tobacco Use    Smoking status: Former     Packs/day: 0.25     Years: 40.00     Pack years: 10.00     Types: Cigarettes     Quit date: 2015     Years since quittin.1    Smokeless tobacco: Never    Tobacco comments:     Quit smoking: It's been over one year since quitting   Substance Use Topics    Alcohol use: No             10/10/2023    12:42 PM   Alcohol Use   Prescreen: >3 drinks/day or >7 drinks/week? Not Applicable     Do you have a current opioid prescription? No  Do you use any other controlled substances or medications that are not prescribed by a provider? None              Current providers sharing in care for this patient include:   Patient Care Team:  Doris Hughes PA-C as PCP - General (Family Medicine)  Doris Hughes PA-C as Assigned PCP    The following health maintenance items are reviewed in Epic and correct as of today:  Health Maintenance   Topic Date Due    ZOSTER IMMUNIZATION (1 of 2) Never done    COVID-19 Vaccine (4 - -24 season) 2023    DTAP/TDAP/TD IMMUNIZATION (2 - Td or Tdap) 2024     MEDICARE ANNUAL WELLNESS VISIT  10/10/2024    FALL RISK ASSESSMENT  10/10/2024    MAMMO SCREENING  10/10/2024    COLORECTAL CANCER SCREENING  10/31/2026    LIPID  10/10/2027    ADVANCE CARE PLANNING  10/10/2028    DEXA  10/28/2036    HEPATITIS C SCREENING  Completed    PHQ-2 (once per calendar year)  Completed    INFLUENZA VACCINE  Completed    Pneumococcal Vaccine: 65+ Years  Completed    IPV IMMUNIZATION  Aged Out    HPV IMMUNIZATION  Aged Out    MENINGITIS IMMUNIZATION  Aged Out    LUNG CANCER SCREENING  Discontinued     Labs reviewed in EPIC  BP Readings from Last 3 Encounters:   10/10/23 138/78   10/10/22 124/83   10/28/21 124/70    Wt Readings from Last 3 Encounters:   10/10/23 73.5 kg (162 lb)   10/10/22 74.2 kg (163 lb 9.6 oz)   10/28/21 76.3 kg (168 lb 3.2 oz)                  Patient Active Problem List   Diagnosis    Breast cancer screening    Family history of hypertension    Health care maintenance    Displacement of intervertebral disc    History of loop electrical excision procedure (LEEP)    History of tobacco use    Papanicolaou smear of cervix with atypical squamous cells of undetermined significance (ASC-US)    Primary vulvar squamous cell carcinoma (H)    Seborrheic keratoses, inflamed    Tobacco abuse    Prediabetes    Hyperlipidemia LDL goal <100    Hypertension, unspecified type    Heartburn     Past Surgical History:   Procedure Laterality Date    COLONOSCOPY  10/31/2016    10/31/2016,F/U  normal    OTHER SURGICAL HISTORY      ,ESC320,COLPOSCOPY,LEEP, westerberg    OTHER SURGICAL HISTORY      ,NECK/THORAX PROCEDURE,Reduction of herniated disc    OTHER SURGICAL HISTORY      2014,2083,RADIATION TREATMENT,vulvar cancer, chemotherapy       Social History     Tobacco Use    Smoking status: Former     Packs/day: 0.25     Years: 40.00     Pack years: 10.00     Types: Cigarettes     Quit date: 2015     Years since quittin.1    Smokeless tobacco: Never    Tobacco comments:      Quit smoking: It's been over one year since quitting   Substance Use Topics    Alcohol use: No     Family History   Problem Relation Age of Onset    Diabetes Father         Diabetes    Other - See Comments Father         Psychiatric illness,Alzheimer's    Breast Cancer Sister 40        Cancer-breast    Family History Negative Mother         Good Health    Hypertension Mother         Hypertension    Family History Negative Sister         Good Health    Diabetes Sister         Diabetes    Family History Negative Brother         Good Health,x3    Diabetes Brother         Diabetes    Diabetes Other         Diabetes,Positive for diabetes mellitus type 2    Colon Cancer Paternal Uncle 80        Cancer-colon    Ovarian Cancer No family hx of         Cancer-ovarian    Prostate Cancer No family hx of         Cancer-prostate    Heart Disease No family hx of         Heart Disease    Thyroid Disease No family hx of         Thyroid Disease    Blood Disease No family hx of         Blood Disease         Current Outpatient Medications   Medication Sig Dispense Refill    calcium citrate-vitamin D (CITRACAL) 315-200 MG-UNIT TABS per tablet Take 1 tablet by mouth 2 times daily      famotidine (PEPCID) 20 MG tablet Take 1 tablet (20 mg) by mouth 2 times daily (before meals) 180 tablet 3    lisinopril (ZESTRIL) 20 MG tablet Take 1 tablet (20 mg) by mouth daily 90 tablet 3    simvastatin (ZOCOR) 20 MG tablet Take 1 tablet (20 mg) by mouth daily 90 tablet 3     Allergies   Allergen Reactions    Ascorbate Other (See Comments)     Cold sores     Recent Labs   Lab Test 10/10/22  1332 10/28/21  1143 10/06/21  1152 10/06/21  1152 10/08/20  1116 10/16/19  1217   A1C 6.1  --   --  6.3*  --   --    LDL 87  --   --  97  --  138*   HDL 61  --   --  52  --  62   TRIG 93  --   --  100  --  97   ALT  --   --   --   --   --  12   CR 0.98 0.86   < > 0.84 0.86 0.87   GFRESTIMATED 62 69   < > 71 66 65   GFRESTBLACK  --   --   --   --  79 79   POTASSIUM  "4.4 4.6   < > 4.0 3.8 4.7    < > = values in this interval not displayed.              Review of Systems   Constitutional:  Negative for chills and fever.   HENT:  Negative for congestion, ear pain and hearing loss.    Eyes:  Negative for pain.   Respiratory:  Negative for cough.    Cardiovascular:  Negative for chest pain and peripheral edema.   Gastrointestinal:  Negative for abdominal pain, constipation, diarrhea, heartburn and hematochezia.   Genitourinary:  Negative for frequency, genital sores and hematuria.   Musculoskeletal:  Negative for arthralgias and joint swelling.   Neurological:  Negative for dizziness and headaches.   Psychiatric/Behavioral:  Negative for mood changes. The patient is not nervous/anxious.          OBJECTIVE:   /78   Pulse 73   Temp 97.7  F (36.5  C) (Tympanic)   Resp 20   Ht 1.562 m (5' 1.5\")   Wt 73.5 kg (162 lb)   SpO2 92%   BMI 30.11 kg/m   Estimated body mass index is 30.11 kg/m  as calculated from the following:    Height as of this encounter: 1.562 m (5' 1.5\").    Weight as of this encounter: 73.5 kg (162 lb).  Physical Exam  GENERAL APPEARANCE: healthy, alert and no distress  EYES: Eyes grossly normal to inspection, PERRL and conjunctivae and sclerae normal  HENT: ear canals and TM's normal, nose and mouth without ulcers or lesions, oropharynx clear and oral mucous membranes moist  NECK: no adenopathy, no asymmetry, masses, or scars and thyroid normal to palpation  RESP: lungs clear to auscultation - no rales, rhonchi or wheezes  CV: regular rate and rhythm, normal S1 S2, no S3 or S4, no murmur, click or rub, no peripheral edema and peripheral pulses strong  ABDOMEN: soft, nontender, no hepatosplenomegaly, no masses and bowel sounds normal  MS: no musculoskeletal defects are noted and gait is age appropriate without ataxia  SKIN: no suspicious lesions or rashes  NEURO: Normal strength and tone, sensory exam grossly normal, mentation intact and speech normal  PSYCH: " mentation appears normal and affect normal/bright    Diagnostic Test Results:  Labs reviewed in Epic    ASSESSMENT / PLAN:       ICD-10-CM    1. Encounter for Medicare annual wellness exam  Z00.00       2. Heartburn  R12 famotidine (PEPCID) 20 MG tablet      3. Hypertension, unspecified type  I10 lisinopril (ZESTRIL) 20 MG tablet     simvastatin (ZOCOR) 20 MG tablet     Basic Metabolic Panel     Basic Metabolic Panel      4. Hyperlipidemia LDL goal <100  E78.5 simvastatin (ZOCOR) 20 MG tablet     Lipid Panel     Lipid Panel      5. Needs flu shot  Z23 INFLUENZA VACCINE 65+ (FLUZONE HD)      6. Prediabetes  R73.03 Hemoglobin A1c     Hemoglobin A1c        Last mammogram was 10/10/2022.  This is not ordered today. Has repeat already scheduled today  DEXA last completed - 10/28/21 - osteopenia.    Pap deferred due to age >65.    Colon cancer screening last completed - 10/31/2016 - normal, repeat in 10 years .     Abdominal aortic aneurysm screening last completed 10/10/2017 - normal.    Hepatitis C screening 10/6/2021 - negative.    Vaccines reviewed flu shot.  History of tobacco abuse, need for low dose chest CT - former, na.     Prediabetes: Completed lab work for monitoring.  Encouraged good diet and exercise.    Patient was given a flu shot.    Hyperlipidemia: Completed lab work for monitoring.  Refilled medication.  No acute concerns at this time.    Hypertension: Refilled medication.  No acute concerns at this time.  Completed lab work for monitoring.  Encourage good diet and exercise.    Heartburn: Patient is currently stable.  No side effects noted with medication.    Repeat physical in 1 year.      Patient has been advised of split billing requirements and indicates understanding: Yes      COUNSELING:  Reviewed preventive health counseling, as reflected in patient instructions       Regular exercise       Healthy diet/nutrition       Vision screening       Hearing screening       Dental care       Bladder  "control       Fall risk prevention       Osteoporosis prevention/bone health      BMI:   Estimated body mass index is 30.11 kg/m  as calculated from the following:    Height as of this encounter: 1.562 m (5' 1.5\").    Weight as of this encounter: 73.5 kg (162 lb).   Weight management plan: Discussed healthy diet and exercise guidelines      She reports that she quit smoking about 8 years ago. Her smoking use included cigarettes. She has a 10.00 pack-year smoking history. She has never used smokeless tobacco.      Appropriate preventive services were discussed with this patient, including applicable screening as appropriate for fall prevention, nutrition, physical activity, Tobacco-use cessation, weight loss and cognition.  Checklist reviewing preventive services available has been given to the patient.    Reviewed patients plan of care and provided an AVS. The Basic Care Plan (routine screening as documented in Health Maintenance) for Irene meets the Care Plan requirement. This Care Plan has been established and reviewed with the Patient.          Doris Hughes PA-C  [facility]    Identified Health Risks:  I have reviewed Opioid Use Disorder and Substance Use Disorder risk factors and made any needed referrals.   "

## 2023-10-10 NOTE — PATIENT INSTRUCTIONS
Patient Education   Personalized Prevention Plan  You are due for the preventive services outlined below.  Your care team is available to assist you in scheduling these services.  If you have already completed any of these items, please share that information with your care team to update in your medical record.  Health Maintenance Due   Topic Date Due     Zoster (Shingles) Vaccine (1 of 2) Never done     Flu Vaccine (1) 09/01/2023     COVID-19 Vaccine (4 - 2023-24 season) 09/01/2023

## 2023-10-10 NOTE — LETTER
October 11, 2023      Irene Saravia  [address] [address]        Dear ,    We are writing to inform you of your test results.    Your electrolytes, kidney function, cholesterol, and hemoglobin A1c which is a diabetic recheck are stable. No concerns were appreciated.     Resulted Orders   Basic Metabolic Panel   Result Value Ref Range    Sodium 137 135 - 145 mmol/L      Comment:      Reference intervals for this test were updated on 09/26/2023 to more accurately reflect our healthy population. There may be differences in the flagging of prior results with similar values performed with this method. Interpretation of those prior results can be made in the context of the updated reference intervals.     Potassium 3.9 3.4 - 5.3 mmol/L    Chloride 98 98 - 107 mmol/L    Carbon Dioxide (CO2) 27 22 - 29 mmol/L    Anion Gap 12 7 - 15 mmol/L    Urea Nitrogen 12.5 8.0 - 23.0 mg/dL    Creatinine 0.81 0.51 - 0.95 mg/dL    GFR Estimate 77 >60 mL/min/1.73m2    Calcium 10.2 8.8 - 10.2 mg/dL    Glucose 119 (H) 70 - 99 mg/dL   Lipid Panel   Result Value Ref Range    Cholesterol 179 <200 mg/dL    Triglycerides 79 <150 mg/dL    Direct Measure HDL 67 >=50 mg/dL    LDL Cholesterol Calculated 96 <=100 mg/dL    Non HDL Cholesterol 112 <130 mg/dL    Narrative    Cholesterol  Desirable:  <200 mg/dL    Triglycerides  Normal:  Less than 150 mg/dL  Borderline High:  150-199 mg/dL  High:  200-499 mg/dL  Very High:  Greater than or equal to 500 mg/dL    Direct Measure HDL  Female:  Greater than or equal to 50 mg/dL   Male:  Greater than or equal to 40 mg/dL    LDL Cholesterol  Desirable:  <100mg/dL  Above Desirable:  100-129 mg/dL   Borderline High:  130-159 mg/dL   High:  160-189 mg/dL   Very High:  >= 190 mg/dL    Non HDL Cholesterol  Desirable:  130 mg/dL  Above Desirable:  130-159 mg/dL  Borderline High:  160-189 mg/dL  High:  190-219 mg/dL  Very High:  Greater than or equal to 220 mg/dL   Hemoglobin A1c    Result Value Ref Range    Hemoglobin A1C 6.2 4.0 - 6.2 %       If you have any questions or concerns, please call the clinic at the number listed above.       Sincerely,      Doris Hughes PA-C

## 2023-10-10 NOTE — NURSING NOTE
"Chief Complaint   Patient presents with    Wellness Visit     medicare     No concerns.  Initial /78   Pulse 73   Temp 97.7  F (36.5  C) (Tympanic)   Resp 20   Ht 1.562 m (5' 1.5\")   Wt 73.5 kg (162 lb)   SpO2 92%   BMI 30.11 kg/m   Estimated body mass index is 30.11 kg/m  as calculated from the following:    Height as of this encounter: 1.562 m (5' 1.5\").    Weight as of this encounter: 73.5 kg (162 lb).  Medication Reconciliation: complete    Donna Pedroza LPN    "

## 2024-09-04 ENCOUNTER — TELEPHONE (OUTPATIENT)
Dept: FAMILY MEDICINE | Facility: OTHER | Age: 74
End: 2024-09-04
Payer: COMMERCIAL

## 2024-09-04 DIAGNOSIS — M81.0 AGE-RELATED OSTEOPOROSIS WITHOUT CURRENT PATHOLOGICAL FRACTURE: ICD-10-CM

## 2024-09-04 DIAGNOSIS — M85.80 OSTEOPENIA, UNSPECIFIED LOCATION: Primary | ICD-10-CM

## 2024-09-04 NOTE — TELEPHONE ENCOUNTER
Reason for call: Request for a referral.    Referral requested for what concern?  Bone Density Test    Have you already been seen by the specialty you need the referral for?  YES    If yes, Date:   OCT 2nd [facility],  Location:   [facility],  Provider:   ANY    If no,  Where do you want to go?   [facility]    Additional comments:   Needs order put in for this    Preferred method for responding to this message: Telephone Call    Phone number patient can be reached at? Home number on file [phone] (home)    If we can't reach you directly, may we leave a detailed response at the number you provided? Yes

## 2024-09-04 NOTE — TELEPHONE ENCOUNTER
I signed the order for a DEXA scan - would be a good idea to get this donet his year (3 years since last done) due to osteopenia.     Edie Canela NP on 9/4/2024 at 4:40 PM

## 2024-09-04 NOTE — TELEPHONE ENCOUNTER
Patient's last dexa scan was done 10/28/2021 (showed osteopenia). She states JENNIFER Yougnblood told her last year her dexa scan was due and she could have it done in 2023 or 2024. She opted to have it done this year and is trying to have dexa scan and mammogram done on the same day. Order is needed if patient is suppose to have dexa scan done again. Please advise on if patient needs another dexa scan or not. Patient would like a call back.     Edie Aguero CMA on 9/4/2024 at 3:59 PM

## 2024-10-02 ENCOUNTER — OFFICE VISIT (OUTPATIENT)
Dept: FAMILY MEDICINE | Facility: OTHER | Age: 74
End: 2024-10-02
Attending: PHYSICIAN ASSISTANT
Payer: MEDICARE

## 2024-10-02 ENCOUNTER — HOSPITAL ENCOUNTER (OUTPATIENT)
Dept: BONE DENSITY | Facility: OTHER | Age: 74
Discharge: HOME | End: 2024-10-02
Attending: NURSE PRACTITIONER
Payer: MEDICARE

## 2024-10-02 ENCOUNTER — HOSPITAL ENCOUNTER (OUTPATIENT)
Dept: MAMMOGRAPHY | Facility: OTHER | Age: 74
Discharge: HOME | End: 2024-10-02
Attending: PHYSICIAN ASSISTANT
Payer: MEDICARE

## 2024-10-02 VITALS
WEIGHT: 153.2 LBS | OXYGEN SATURATION: 98 % | HEART RATE: 95 BPM | HEIGHT: 62 IN | SYSTOLIC BLOOD PRESSURE: 130 MMHG | TEMPERATURE: 97.6 F | DIASTOLIC BLOOD PRESSURE: 68 MMHG | BODY MASS INDEX: 28.19 KG/M2

## 2024-10-02 DIAGNOSIS — Z12.31 VISIT FOR SCREENING MAMMOGRAM: ICD-10-CM

## 2024-10-02 DIAGNOSIS — R73.03 PREDIABETES: ICD-10-CM

## 2024-10-02 DIAGNOSIS — I10 HYPERTENSION, UNSPECIFIED TYPE: ICD-10-CM

## 2024-10-02 DIAGNOSIS — E78.5 HYPERLIPIDEMIA LDL GOAL <100: ICD-10-CM

## 2024-10-02 DIAGNOSIS — R12 HEARTBURN: ICD-10-CM

## 2024-10-02 DIAGNOSIS — C51.9 PRIMARY VULVAR SQUAMOUS CELL CARCINOMA (H): ICD-10-CM

## 2024-10-02 DIAGNOSIS — Z00.00 ENCOUNTER FOR MEDICARE ANNUAL WELLNESS EXAM: Primary | ICD-10-CM

## 2024-10-02 DIAGNOSIS — M85.80 OSTEOPENIA, UNSPECIFIED LOCATION: ICD-10-CM

## 2024-10-02 DIAGNOSIS — M81.0 AGE-RELATED OSTEOPOROSIS WITHOUT CURRENT PATHOLOGICAL FRACTURE: ICD-10-CM

## 2024-10-02 LAB
ANION GAP SERPL CALCULATED.3IONS-SCNC: 12 MMOL/L (ref 7–15)
BUN SERPL-MCNC: 12 MG/DL (ref 8–23)
CALCIUM SERPL-MCNC: 9.5 MG/DL (ref 8.8–10.4)
CHLORIDE SERPL-SCNC: 97 MMOL/L (ref 98–107)
CHOLEST SERPL-MCNC: 170 MG/DL
CREAT SERPL-MCNC: 0.79 MG/DL (ref 0.51–0.95)
EGFRCR SERPLBLD CKD-EPI 2021: 79 ML/MIN/1.73M2
EST. AVERAGE GLUCOSE BLD GHB EST-MCNC: 134 MG/DL
FASTING STATUS PATIENT QL REPORTED: YES
FASTING STATUS PATIENT QL REPORTED: YES
GLUCOSE SERPL-MCNC: 126 MG/DL (ref 70–99)
HBA1C MFR BLD: 6.3 %
HCO3 SERPL-SCNC: 27 MMOL/L (ref 22–29)
HDLC SERPL-MCNC: 62 MG/DL
LDLC SERPL CALC-MCNC: 90 MG/DL
NONHDLC SERPL-MCNC: 108 MG/DL
POTASSIUM SERPL-SCNC: 4.2 MMOL/L (ref 3.4–5.3)
SODIUM SERPL-SCNC: 136 MMOL/L (ref 135–145)
TRIGL SERPL-MCNC: 90 MG/DL

## 2024-10-02 PROCEDURE — 36415 COLL VENOUS BLD VENIPUNCTURE: CPT | Mod: ZL | Performed by: PHYSICIAN ASSISTANT

## 2024-10-02 PROCEDURE — 77080 DXA BONE DENSITY AXIAL: CPT

## 2024-10-02 PROCEDURE — G0463 HOSPITAL OUTPT CLINIC VISIT: HCPCS

## 2024-10-02 PROCEDURE — G0439 PPPS, SUBSEQ VISIT: HCPCS | Performed by: PHYSICIAN ASSISTANT

## 2024-10-02 PROCEDURE — 83036 HEMOGLOBIN GLYCOSYLATED A1C: CPT | Mod: ZL | Performed by: PHYSICIAN ASSISTANT

## 2024-10-02 PROCEDURE — 77063 BREAST TOMOSYNTHESIS BI: CPT

## 2024-10-02 PROCEDURE — 80048 BASIC METABOLIC PNL TOTAL CA: CPT | Mod: ZL | Performed by: PHYSICIAN ASSISTANT

## 2024-10-02 PROCEDURE — 82465 ASSAY BLD/SERUM CHOLESTEROL: CPT | Mod: ZL | Performed by: PHYSICIAN ASSISTANT

## 2024-10-02 RX ORDER — SIMVASTATIN 20 MG
20 TABLET ORAL DAILY
Qty: 90 TABLET | Refills: 3 | Status: SHIPPED | OUTPATIENT
Start: 2024-10-02

## 2024-10-02 RX ORDER — FAMOTIDINE 20 MG/1
20 TABLET, FILM COATED ORAL
Qty: 180 TABLET | Refills: 3 | Status: SHIPPED | OUTPATIENT
Start: 2024-10-02

## 2024-10-02 RX ORDER — LISINOPRIL 20 MG/1
20 TABLET ORAL DAILY
Qty: 90 TABLET | Refills: 3 | Status: SHIPPED | OUTPATIENT
Start: 2024-10-02

## 2024-10-02 SDOH — HEALTH STABILITY: PHYSICAL HEALTH: ON AVERAGE, HOW MANY DAYS PER WEEK DO YOU ENGAGE IN MODERATE TO STRENUOUS EXERCISE (LIKE A BRISK WALK)?: 3 DAYS

## 2024-10-02 ASSESSMENT — SOCIAL DETERMINANTS OF HEALTH (SDOH): HOW OFTEN DO YOU GET TOGETHER WITH FRIENDS OR RELATIVES?: TWICE A WEEK

## 2024-10-02 NOTE — PATIENT INSTRUCTIONS
Patient Education   Preventive Care Advice   This is general advice given by our system to help you stay healthy. However, your care team may have specific advice just for you. Please talk to your care team about your preventive care needs.  Nutrition  Eat 5 or more servings of fruits and vegetables each day.  Try wheat bread, brown rice and whole grain pasta (instead of white bread, rice, and pasta).  Get enough calcium and vitamin D. Check the label on foods and aim for 100% of the RDA (recommended daily allowance).  Lifestyle  Exercise at least 150 minutes each week  (30 minutes a day, 5 days a week).  Do muscle strengthening activities 2 days a week. These help control your weight and prevent disease.  No smoking.  Wear sunscreen to prevent skin cancer.  Have a dental exam and cleaning every 6 months.  Yearly exams  See your health care team every year to talk about:  Any changes in your health.  Any medicines your care team has prescribed.  Preventive care, family planning, and ways to prevent chronic diseases.  Shots (vaccines)   HPV shots (up to age 26), if you've never had them before.  Hepatitis B shots (up to age 59), if you've never had them before.  COVID-19 shot: Get this shot when it's due.  Flu shot: Get a flu shot every year.  Tetanus shot: Get a tetanus shot every 10 years.  Pneumococcal, hepatitis A, and RSV shots: Ask your care team if you need these based on your risk.  Shingles shot (for age 50 and up)  General health tests  Diabetes screening:  Starting at age 35, Get screened for diabetes at least every 3 years.  If you are younger than age 35, ask your care team if you should be screened for diabetes.  Cholesterol test: At age 39, start having a cholesterol test every 5 years, or more often if advised.  Bone density scan (DEXA): At age 50, ask your care team if you should have this scan for osteoporosis (brittle bones).  Hepatitis C: Get tested at least once in your life.  STIs (sexually  transmitted infections)  Before age 24: Ask your care team if you should be screened for STIs.  After age 24: Get screened for STIs if you're at risk. You are at risk for STIs (including HIV) if:  You are sexually active with more than one person.  You don't use condoms every time.  You or a partner was diagnosed with a sexually transmitted infection.  If you are at risk for HIV, ask about PrEP medicine to prevent HIV.  Get tested for HIV at least once in your life, whether you are at risk for HIV or not.  Cancer screening tests  Cervical cancer screening: If you have a cervix, begin getting regular cervical cancer screening tests starting at age 21.  Breast cancer scan (mammogram): If you've ever had breasts, begin having regular mammograms starting at age 40. This is a scan to check for breast cancer.  Colon cancer screening: It is important to start screening for colon cancer at age 45.  Have a colonoscopy test every 10 years (or more often if you're at risk) Or, ask your provider about stool tests like a FIT test every year or Cologuard test every 3 years.  To learn more about your testing options, visit:   .  For help making a decision, visit:   https://bit.ly/gw59085.  Prostate cancer screening test: If you have a prostate, ask your care team if a prostate cancer screening test (PSA) at age 55 is right for you.  Lung cancer screening: If you are a current or former smoker ages 50 to 80, ask your care team if ongoing lung cancer screenings are right for you.  For informational purposes only. Not to replace the advice of your health care provider. Copyright   2023 [facility]. All rights reserved. Clinically reviewed by the [facility] Transitions Program. [employer] [id number] - REV 01/24.  Eating Healthy Foods: Care Instructions  With every meal, you can make healthy food choices. Try to eat a variety of fruits, vegetables, whole grains, lean proteins, and low-fat dairy products. This can help  "you get the right balance of nutrients, including vitamins and minerals. Small changes add up over time. You can start by adding one healthy food to your meals each day.    Try to make half your plate fruits and vegetables, one-fourth whole grains, and one-fourth lean proteins. Try including dairy with your meals.   Eat more fruits and vegetables. Try to have them with most meals and snacks.   Foods for healthy eating    Fruits    These can be fresh, frozen, canned, or dried.  Try to choose whole fruit rather than fruit juice.  Eat a variety of colors.    Vegetables    These can be fresh, frozen, canned, or dried.  Beans, peas, and lentils count too.    Whole grains    Choose whole-grain breads, cereals, and noodles.  Try brown rice.    Lean proteins    These can include lean meat, poultry, fish, and eggs.  You can also have tofu, beans, peas, lentils, nuts, and seeds.    Dairy    Try milk, yogurt, and cheese.  Choose low-fat or fat-free when you can.  If you need to, use lactose-free milk or fortified plant-based milk products, such as soy milk.    Water    Drink water when you're thirsty.  Limit sugar-sweetened drinks, including soda, fruit drinks, and sports drinks.  Where can you learn more?  Go to https://www.[employer].net/patiented  Enter T756 in the search box to learn more about \"Eating Healthy Foods: Care Instructions.\"  Current as of: September 20, 2023               Content Version: 14.0    8640-0377 [employer] (formerly [employer]).   Care instructions adapted under license by your healthcare professional. If you have questions about a medical condition or this instruction, always ask your healthcare professional. [employer], [employer] disclaims any warranty or liability for your use of this information.         "

## 2024-10-02 NOTE — PROGRESS NOTES
Preventive Care Visit  [facility]  Doris Hughes PA-C, Family Medicine  Oct 2, 2024      Assessment & Plan   Problem List Items Addressed This Visit          Digestive    Heartburn    Relevant Medications    famotidine (PEPCID) 20 MG tablet       Endocrine    Prediabetes    Relevant Orders    Hemoglobin A1c (Completed)    Hyperlipidemia LDL goal <100    Relevant Medications    simvastatin (ZOCOR) 20 MG tablet    Other Relevant Orders    Lipid Panel       Circulatory    Hypertension, unspecified type    Relevant Medications    lisinopril (ZESTRIL) 20 MG tablet    simvastatin (ZOCOR) 20 MG tablet    Other Relevant Orders    Basic Metabolic Panel       Urinary    Primary vulvar squamous cell carcinoma (H)     Other Visit Diagnoses       Encounter for Medicare annual wellness exam    -  Primary           Last mammogram was 10/10/2023.  This is not ordered today. Has repeat scheduled today.  DEXA last completed: 10/28/2021, osteopenia.  Has repeat scheduled today  Calcium intake: yes   Pap deferred due to age >65.    Colon cancer screening last completed: 10/31/2016 - follow up 2026, normal.     Abdominal aortic aneurysm screening last completed: 10/10/2017 - normal.    Hepatitis C screening: 10/6/2021 - normal.    Vaccines reviewed: Up-to-date.  History of tobacco abuse, need for low dose chest CT: former, na.     Heartburn: Currently stable.  Refill medication.  No acute concerns at this time.    Hypertension and hyperlipidemia: Completed lab work for monitoring.  Vital signs are stable.  Tolerating medications well.  No acute concerns at this time.    Primary vulvar squamous cell carcinoma: Currently stable.  No workup is warranted at this time.    Prediabetes: Continued lab work for monitoring.  Labs are pending.  Encouraged good diet and exercise.    Repeat physical in 1 year.      Patient has been advised of split billing requirements and indicates understanding: Yes       BMI  Estimated body  "mass index is 28.02 kg/m  as calculated from the following:    Height as of this encounter: 1.575 m (5' 2\").    Weight as of this encounter: 69.5 kg (153 lb 3.2 oz).       Counseling  Appropriate preventive services were addressed with this patient via screening, questionnaire, or discussion as appropriate for fall prevention, nutrition, physical activity, Tobacco-use cessation, social engagement, weight loss and cognition.  Checklist reviewing preventive services available has been given to the patient.  Reviewed patient's diet, addressing concerns and/or questions.   She is at risk for lack of exercise and has been provided with information to increase physical activity for the benefit of her well-being.   The patient was instructed to see the dentist every 6 months.   She is at risk for psychosocial distress and has been provided with information to reduce risk.     See Patient Instructions    No follow-ups on file.      Lisa Bonilla is a 74 year old, presenting for the following:  Medicare Visit        10/2/2024    11:17 AM   Additional Questions   Roomed by Lashell SIMMONS CMA         Health Care Directive  Patient does not have a Health Care Directive or Living Will: Discussed advance care planning with patient; however, patient declined at this time.    HPI    Hypertension and hyperlipidemia: Currently doing well with her medications.  No Side effects.  No chest pain, palpitations, problems breathing, GI or urinary symptoms.  Patient is doing well.    Heartburn has been doing well.  Currently taking the tablet twice daily.  No longer waking up with heartburn type symptoms.  Doing well.    History of primary vulvar squamous cell carcinoma.  Doing well.  Asymptomatic.  No vaginal itching or discharge.  No abnormal vaginal bleeding or spotting.    Patient is due for lab work for monitoring with her medications along with history of prediabetes.  Patient feels like she is doing well with diet and exercise.  Has " lost a little weight.          10/2/2024   General Health   How would you rate your overall physical health? Excellent   Feel stress (tense, anxious, or unable to sleep) Only a little      (!) STRESS CONCERN      10/2/2024   Nutrition   Diet: Regular (no restrictions)            10/2/2024   Exercise   Days per week of moderate/strenous exercise 3 days            10/2/2024   Social Factors   Frequency of gathering with friends or relatives Twice a week   Worry food won't last until get money to buy more No   Food not last or not have enough money for food? No   Do you have housing? (Housing is defined as stable permanent housing and does not include staying ouside in a car, in a tent, in an abandoned building, in an overnight shelter, or couch-surfing.) Yes   Are you worried about losing your housing? No   Lack of transportation? No   Unable to get utilities (heat,electricity)? No            10/2/2024   Fall Risk   Fallen 2 or more times in the past year? No   Trouble with walking or balance? No             10/2/2024   Activities of Daily Living- Home Safety   Needs help with the following daily activites None of the above   Safety concerns in the home None of the above            10/2/2024   Dental   Dentist two times every year? (!) NO            10/2/2024   Hearing Screening   Hearing concerns? None of the above            10/2/2024   Driving Risk Screening   Patient/family members have concerns about driving No            10/2/2024   General Alertness/Fatigue Screening   Have you been more tired than usual lately? No            10/2/2024   Urinary Incontinence Screening   Bothered by leaking urine in past 6 months No            10/2/2024   TB Screening   Were you born outside of the [place]? Yes            Today's PHQ-2 Score:       10/2/2024    11:07 AM   PHQ-2 ( 1999 [employer])   Q1: Little interest or pleasure in doing things 0   Q2: Feeling down, depressed or hopeless 0   PHQ-2 Score 0   Q1: Little interest or  pleasure in doing things Not at all   Q2: Feeling down, depressed or hopeless Not at all   PHQ-2 Score 0           10/2/2024   Substance Use   Alcohol more than 3/day or more than 7/wk Not Applicable   Do you have a current opioid prescription? No   How severe/bad is pain from 1 to 10? 0/10 (No Pain)   Do you use any other substances recreationally? No        Social History     Tobacco Use    Smoking status: Former     Current packs/day: 0.00     Average packs/day: 0.3 packs/day for 40.0 years (10.0 ttl pk-yrs)     Types: Cigarettes     Start date: 1975     Quit date: 2015     Years since quittin.1    Smokeless tobacco: Never    Tobacco comments:     Quit smoking: It's been over one year since quitting   Vaping Use    Vaping status: Never Used   Substance Use Topics    Alcohol use: No    Drug use: Never           10/10/2022   LAST FHS-7 RESULTS   1st degree relative breast or ovarian cancer Yes   Any relative bilateral breast cancer No   Any male have breast cancer No   Any ONE woman have BOTH breast AND ovarian cancer No   Any woman with breast cancer before 50yrs Yes   2 or more relatives with breast AND/OR ovarian cancer No   2 or more relatives with breast AND/OR bowel cancer No           Mammogram Screening - Mammogram every 1-2 years updated in Health Maintenance based on mutual decision making    ASCVD Risk   The 10-year ASCVD risk score (Veronica JACKSON, et al., 2019) is: 15.3%    Values used to calculate the score:      Age: 74 years      Sex: Female      Is Non- : No      Diabetic: No      Tobacco smoker: No      Systolic Blood Pressure: 130 mmHg      Is BP treated: Yes      HDL Cholesterol: 67 mg/dL      Total Cholesterol: 179 mg/dL            Reviewed and updated as needed this visit by Provider   Tobacco  Allergies  Meds  Problems  Med Hx  Surg Hx  Fam Hx            Past Medical History:   Diagnosis Date    Nicotine dependence, uncomplicated     No  Comments Provided    Personal history of other complications of pregnancy, childbirth and the puerperium     No Comments Provided    Personal history of other medical treatment (CODE)     No Comments Provided     Past Surgical History:   Procedure Laterality Date    COLONOSCOPY  10/31/2016    10/31/2016,F/U 2026 normal    OTHER SURGICAL HISTORY      2011,KCQ948,COLPOSCOPY,LEEP, [place]    OTHER SURGICAL HISTORY      31087,NECK/THORAX PROCEDURE,Reduction of herniated disc    OTHER SURGICAL HISTORY      8/2014,208356,RADIATION TREATMENT,vulvar cancer, chemotherapy     OB History   No obstetric history on file.     Labs reviewed in EPIC  BP Readings from Last 3 Encounters:   10/02/24 130/68   10/10/23 138/78   10/10/22 124/83    Wt Readings from Last 3 Encounters:   10/02/24 69.5 kg (153 lb 3.2 oz)   10/10/23 73.5 kg (162 lb)   10/10/22 74.2 kg (163 lb 9.6 oz)                  Patient Active Problem List   Diagnosis    Breast cancer screening    Family history of hypertension    Health care maintenance    Displacement of intervertebral disc    History of loop electrical excision procedure (LEEP)    History of tobacco use    Papanicolaou smear of cervix with atypical squamous cells of undetermined significance (ASC-US)    Primary vulvar squamous cell carcinoma (H)    Seborrheic keratoses, inflamed    Tobacco abuse    Prediabetes    Hyperlipidemia LDL goal <100    Hypertension, unspecified type    Heartburn    Vulvar cancer (H)     Past Surgical History:   Procedure Laterality Date    COLONOSCOPY  10/31/2016    10/31/2016,F/U 2026 normal    OTHER SURGICAL HISTORY      2011,SDC114,COLPOSCOPY,LEEP, [place]    OTHER SURGICAL HISTORY      18697,NECK/THORAX PROCEDURE,Reduction of herniated disc    OTHER SURGICAL HISTORY      8/2014,208356,RADIATION TREATMENT,vulvar cancer, chemotherapy       Social History     Tobacco Use    Smoking status: Former     Current packs/day: 0.00     Average packs/day: 0.3 packs/day for 40.0  years (10.0 ttl pk-yrs)     Types: Cigarettes     Start date: 1975     Quit date: 2015     Years since quittin.1    Smokeless tobacco: Never    Tobacco comments:     Quit smoking: It's been over one year since quitting   Substance Use Topics    Alcohol use: No     Family History   Problem Relation Age of Onset    Diabetes Father         Diabetes    Other - See Comments Father         Psychiatric illness,Alzheimer's    Breast Cancer Sister 40        Cancer-breast    Family History Negative Mother         Good Health    Hypertension Mother         Hypertension    Family History Negative Sister         Good Health    Diabetes Sister         Diabetes    Family History Negative Brother         Good Health,x3    Diabetes Brother         Diabetes    Diabetes Other         Diabetes,Positive for diabetes mellitus type 2    Colon Cancer Paternal Uncle 80        Cancer-colon    Ovarian Cancer No family hx of         Cancer-ovarian    Prostate Cancer No family hx of         Cancer-prostate    Heart Disease No family hx of         Heart Disease    Thyroid Disease No family hx of         Thyroid Disease    Blood Disease No family hx of         Blood Disease         Current Outpatient Medications   Medication Sig Dispense Refill    calcium citrate-vitamin D (CITRACAL) 315-200 MG-UNIT TABS per tablet Take 1 tablet by mouth 2 times daily      famotidine (PEPCID) 20 MG tablet Take 1 tablet (20 mg) by mouth 2 times daily (before meals). 180 tablet 3    lisinopril (ZESTRIL) 20 MG tablet Take 1 tablet (20 mg) by mouth daily. 90 tablet 3    simvastatin (ZOCOR) 20 MG tablet Take 1 tablet (20 mg) by mouth daily. 90 tablet 3     Allergies   Allergen Reactions    Ascorbate Other (See Comments)     Cold sores     Recent Labs   Lab Test 10/02/24  1150 10/10/23  1318 10/10/22  1332 10/28/21  1143 10/06/21  1152 10/08/20  1116 10/16/19  1217   0000   A1C 6.3* 6.2 6.1  --  6.3*  --   --    < >   LDL  --  96 87  --  97  --  138*  --   "  HDL  --  67 61  --  52  --  62  --    TRIG  --  79 93  --  100  --  97  --    ALT  --   --   --   --   --   --  12  --    CR  --  0.81 0.98   < > 0.84 0.86 0.87  --    GFRESTIMATED  --  77 62   < > 71 66 65  --    GFRESTBLACK  --   --   --   --   --  79 79  --    POTASSIUM  --  3.9 4.4   < > 4.0 3.8 4.7  --     < > = values in this interval not displayed.      Current providers sharing in care for this patient include:  Patient Care Team:  Doris Hughes PA-C as PCP - General (Family Medicine)  Doris Hughes PA-C as Assigned PCP    The following health maintenance items are reviewed in Epic and correct as of today:  Health Maintenance   Topic Date Due    BMP  10/10/2024    LIPID  10/10/2024    MEDICARE ANNUAL WELLNESS VISIT  10/02/2025    FALL RISK ASSESSMENT  10/02/2025    MAMMO SCREENING  10/10/2025    GLUCOSE  10/10/2026    COLORECTAL CANCER SCREENING  10/31/2026    ADVANCE CARE PLANNING  10/02/2029    DTAP/TDAP/TD IMMUNIZATION (3 - Td or Tdap) 07/18/2034    DEXA  10/28/2036    HEPATITIS C SCREENING  Completed    PHQ-2 (once per calendar year)  Completed    INFLUENZA VACCINE  Completed    Pneumococcal Vaccine: 65+ Years  Completed    ZOSTER IMMUNIZATION  Completed    RSV VACCINE  Completed    COVID-19 Vaccine  Completed    HPV IMMUNIZATION  Aged Out    MENINGITIS IMMUNIZATION  Aged Out    RSV MONOCLONAL ANTIBODY  Aged Out    LUNG CANCER SCREENING  Discontinued         Review of Systems  Constitutional, neuro, ENT, endocrine, pulmonary, cardiac, gastrointestinal, genitourinary, musculoskeletal, integument and psychiatric systems are negative, except as otherwise noted.     Objective    Exam  /68   Pulse 95   Temp 97.6  F (36.4  C) (Tympanic)   Ht 1.575 m (5' 2\")   Wt 69.5 kg (153 lb 3.2 oz)   SpO2 98%   BMI 28.02 kg/m     Estimated body mass index is 28.02 kg/m  as calculated from the following:    Height as of this encounter: 1.575 m (5' 2\").    Weight as of this encounter: 69.5 kg (153 " lb 3.2 oz).    Physical Exam  GENERAL: alert and no distress  EYES: Eyes grossly normal to inspection, PERRL and conjunctivae and sclerae normal  HENT: ear canals and TM's normal, nose and mouth without ulcers or lesions  NECK: no adenopathy, no asymmetry, masses, or scars  RESP: lungs clear to auscultation - no rales, rhonchi or wheezes  CV: regular rate and rhythm, normal S1 S2, no S3 or S4, no murmur, click or rub, no peripheral edema  ABDOMEN: soft, nontender, no hepatosplenomegaly, no masses and bowel sounds normal  MS: no gross musculoskeletal defects noted, no edema  SKIN: no suspicious lesions or rashes  NEURO: Normal strength and tone, mentation intact and speech normal  PSYCH: mentation appears normal, affect normal/bright        10/2/2024   Mini Cog   Clock Draw Score 2 Normal   3 Item Recall 3 objects recalled   Mini Cog Total Score 5                 Signed Electronically by: Doris Hughes PA-C

## 2024-10-02 NOTE — LETTER
October 2, 2024      Ireen Saravia  [address]  [place] [address]        Dear ,    We are writing to inform you of your test results.    Your hemoglobin A1c which is a diabetic test, cholesterol, electrolytes, kidney function, and blood sugar are stable.    Resulted Orders   Hemoglobin A1c   Result Value Ref Range    Estimated Average Glucose 134 (H) <117 mg/dL    Hemoglobin A1C 6.3 (H) <5.7 %      Comment:      Normal <5.7%   Prediabetes 5.7-6.4%    Diabetes 6.5% or higher     Note: Adopted from ADA consensus guidelines.   Lipid Panel   Result Value Ref Range    Cholesterol 170 <200 mg/dL    Triglycerides 90 <150 mg/dL    Direct Measure HDL 62 >=50 mg/dL    LDL Cholesterol Calculated 90 <100 mg/dL    Non HDL Cholesterol 108 <130 mg/dL    Patient Fasting > 8hrs? Yes     Narrative    Cholesterol  Desirable: < 200 mg/dL  Borderline High: 200 - 239 mg/dL  High: >= 240 mg/dL    Triglycerides  Normal: < 150 mg/dL  Borderline High: 150 - 199 mg/dL  High: 200-499 mg/dL  Very High: >= 500 mg/dL    Direct Measure HDL  Female: >= 50 mg/dL   Male: >= 40 mg/dL    LDL Cholesterol  Desirable: < 100 mg/dL  Above Desirable: 100 - 129 mg/dL   Borderline High: 130 - 159 mg/dL   High:  160 - 189 mg/dL   Very High: >= 190 mg/dL    Non HDL Cholesterol  Desirable: < 130 mg/dL  Above Desirable: 130 - 159 mg/dL  Borderline High: 160 - 189 mg/dL  High: 190 - 219 mg/dL  Very High: >= 220 mg/dL   Basic Metabolic Panel   Result Value Ref Range    Sodium 136 135 - 145 mmol/L    Potassium 4.2 3.4 - 5.3 mmol/L    Chloride 97 (L) 98 - 107 mmol/L    Carbon Dioxide (CO2) 27 22 - 29 mmol/L    Anion Gap 12 7 - 15 mmol/L    Urea Nitrogen 12.0 8.0 - 23.0 mg/dL    Creatinine 0.79 0.51 - 0.95 mg/dL    GFR Estimate 79 >60 mL/min/1.73m2      Comment:      eGFR calculated using 2021 CKD-EPI equation.    Calcium 9.5 8.8 - 10.4 mg/dL      Comment:      Reference intervals for this test were updated on 7/16/2024 to reflect our healthy  population more accurately. There may be differences in the flagging of prior results with similar values performed with this method. Those prior results can be interpreted in the context of the updated reference intervals.    Glucose 126 (H) 70 - 99 mg/dL    Patient Fasting > 8hrs? Yes        If you have any questions or concerns, please call the clinic at the number listed above.       Sincerely,      Doris Hughes PA-C

## 2024-10-02 NOTE — NURSING NOTE
"Chief Complaint   Patient presents with    Medicare Visit     Patient here for annual. She is fasting. She had all of her vaccines at pharmacy.  Initial /68   Pulse 95   Temp 97.6  F (36.4  C) (Tympanic)   Ht 1.575 m (5' 2\")   Wt 69.5 kg (153 lb 3.2 oz)   SpO2 98%   BMI 28.02 kg/m   Estimated body mass index is 28.02 kg/m  as calculated from the following:    Height as of this encounter: 1.575 m (5' 2\").    Weight as of this encounter: 69.5 kg (153 lb 3.2 oz).  Medication Review: complete    The next two questions are to help us understand your food security.  If you are feeling you need any assistance in this area, we have resources available to support you today.          10/2/2024   SDOH- Food Insecurity   Within the past 12 months, did you worry that your food would run out before you got money to buy more? N   Within the past 12 months, did the food you bought just not last and you didn t have money to get more? N            Health Care Directive:  Patient does not have a Health Care Directive or Living Will: Discussed advance care planning with patient; however, patient declined at this time.    Lashell Donaldson MA      "

## 2024-10-03 ENCOUNTER — TELEPHONE (OUTPATIENT)
Dept: FAMILY MEDICINE | Facility: OTHER | Age: 74
End: 2024-10-03
Payer: COMMERCIAL

## 2024-10-03 DIAGNOSIS — M81.0 AGE-RELATED OSTEOPOROSIS WITHOUT CURRENT PATHOLOGICAL FRACTURE: Primary | ICD-10-CM

## 2024-10-03 RX ORDER — ALENDRONATE SODIUM 70 MG/1
70 TABLET ORAL
Qty: 12 TABLET | Refills: 3 | Status: SHIPPED | OUTPATIENT
Start: 2024-10-03

## 2024-10-03 NOTE — TELEPHONE ENCOUNTER
Patient informed of dexa scan results. She is very concerned about the side effects so she doesn't know if she will start it. She is also concerned about the cost of it and asked writer how much it would cost. Writer informed patient she cannot answer that. A script would have to be sent to the pharmacy and she would have to call them to see what the cost would be. Patient requests to have script sent so she can check price and then she will decide if she is going to start it.     Edie Aguero CMA on 10/3/2024 at 2:02 PM